# Patient Record
Sex: FEMALE | Race: WHITE | Employment: UNEMPLOYED | URBAN - NONMETROPOLITAN AREA
[De-identification: names, ages, dates, MRNs, and addresses within clinical notes are randomized per-mention and may not be internally consistent; named-entity substitution may affect disease eponyms.]

---

## 2017-01-01 ENCOUNTER — APPOINTMENT (OUTPATIENT)
Dept: GENERAL RADIOLOGY | Age: 67
DRG: 637 | End: 2017-01-01
Payer: MEDICARE

## 2017-01-01 ENCOUNTER — TELEPHONE (OUTPATIENT)
Dept: OTHER | Age: 67
End: 2017-01-01

## 2017-01-01 ENCOUNTER — HOSPITAL ENCOUNTER (INPATIENT)
Age: 67
LOS: 1 days | DRG: 638 | End: 2017-11-03
Attending: INTERNAL MEDICINE | Admitting: INTERNAL MEDICINE
Payer: COMMERCIAL

## 2017-01-01 ENCOUNTER — APPOINTMENT (OUTPATIENT)
Dept: CT IMAGING | Age: 67
DRG: 637 | End: 2017-01-01
Payer: MEDICARE

## 2017-01-01 ENCOUNTER — HOSPITAL ENCOUNTER (INPATIENT)
Age: 67
LOS: 2 days | Discharge: HOSPICE | DRG: 637 | End: 2017-11-02
Attending: EMERGENCY MEDICINE | Admitting: SURGERY
Payer: MEDICARE

## 2017-01-01 ENCOUNTER — TELEPHONE (OUTPATIENT)
Dept: NEUROLOGY | Age: 67
End: 2017-01-01

## 2017-01-01 ENCOUNTER — OFFICE VISIT (OUTPATIENT)
Dept: NEPHROLOGY | Age: 67
End: 2017-01-01

## 2017-01-01 ENCOUNTER — OFFICE VISIT (OUTPATIENT)
Dept: PHYSICAL MEDICINE AND REHAB | Age: 67
End: 2017-01-01

## 2017-01-01 VITALS
HEIGHT: 64 IN | HEART RATE: 86 BPM | TEMPERATURE: 98.6 F | WEIGHT: 55.8 LBS | BODY MASS INDEX: 9.53 KG/M2 | DIASTOLIC BLOOD PRESSURE: 55 MMHG | OXYGEN SATURATION: 99 % | RESPIRATION RATE: 16 BRPM | SYSTOLIC BLOOD PRESSURE: 125 MMHG

## 2017-01-01 VITALS
WEIGHT: 125.2 LBS | HEART RATE: 91 BPM | RESPIRATION RATE: 16 BRPM | SYSTOLIC BLOOD PRESSURE: 112 MMHG | BODY MASS INDEX: 21.37 KG/M2 | HEIGHT: 64 IN | TEMPERATURE: 97.9 F | DIASTOLIC BLOOD PRESSURE: 83 MMHG | OXYGEN SATURATION: 92 %

## 2017-01-01 VITALS — DIASTOLIC BLOOD PRESSURE: 60 MMHG | SYSTOLIC BLOOD PRESSURE: 142 MMHG | HEART RATE: 80 BPM | RESPIRATION RATE: 16 BRPM

## 2017-01-01 VITALS
BODY MASS INDEX: 18.78 KG/M2 | SYSTOLIC BLOOD PRESSURE: 128 MMHG | HEIGHT: 64 IN | DIASTOLIC BLOOD PRESSURE: 70 MMHG | HEART RATE: 85 BPM | WEIGHT: 110 LBS

## 2017-01-01 DIAGNOSIS — N17.9 AKI (ACUTE KIDNEY INJURY) (HCC): Primary | ICD-10-CM

## 2017-01-01 DIAGNOSIS — E87.29 METABOLIC ACIDOSIS, INCREASED ANION GAP: ICD-10-CM

## 2017-01-01 DIAGNOSIS — G25.2 RESTING TREMOR: Primary | ICD-10-CM

## 2017-01-01 DIAGNOSIS — N18.30 CKD (CHRONIC KIDNEY DISEASE) STAGE 3, GFR 30-59 ML/MIN (HCC): ICD-10-CM

## 2017-01-01 DIAGNOSIS — I10 BENIGN ESSENTIAL HTN: ICD-10-CM

## 2017-01-01 DIAGNOSIS — G93.41 METABOLIC ENCEPHALOPATHY: ICD-10-CM

## 2017-01-01 DIAGNOSIS — R79.89 ELEVATED BRAIN NATRIURETIC PEPTIDE (BNP) LEVEL: ICD-10-CM

## 2017-01-01 DIAGNOSIS — E11.11 DIABETIC KETOACIDOSIS WITH COMA ASSOCIATED WITH TYPE 2 DIABETES MELLITUS (HCC): ICD-10-CM

## 2017-01-01 DIAGNOSIS — R55 SYNCOPE AND COLLAPSE: ICD-10-CM

## 2017-01-01 DIAGNOSIS — E11.21 TYPE 2 DIABETES MELLITUS WITH DIABETIC NEPHROPATHY, UNSPECIFIED LONG TERM INSULIN USE STATUS: ICD-10-CM

## 2017-01-01 DIAGNOSIS — R77.8 ELEVATED TROPONIN: ICD-10-CM

## 2017-01-01 LAB
AEROBIC CULTURE: ABNORMAL
AEROBIC CULTURE: NORMAL
ALLEN TEST: POSITIVE
AMPHETAMINE+METHAMPHETAMINE URINE SCREEN: NEGATIVE
ANION GAP SERPL CALCULATED.3IONS-SCNC: 11 MEQ/L (ref 8–16)
ANION GAP SERPL CALCULATED.3IONS-SCNC: 13 MEQ/L (ref 8–16)
ANION GAP SERPL CALCULATED.3IONS-SCNC: 14 MEQ/L (ref 8–16)
ANION GAP SERPL CALCULATED.3IONS-SCNC: 17 MEQ/L (ref 8–16)
ANION GAP SERPL CALCULATED.3IONS-SCNC: 18 MEQ/L (ref 8–16)
ANION GAP SERPL CALCULATED.3IONS-SCNC: 22 MEQ/L (ref 8–16)
ANION GAP SERPL CALCULATED.3IONS-SCNC: 25 MEQ/L (ref 8–16)
ANION GAP SERPL CALCULATED.3IONS-SCNC: 29 MEQ/L (ref 8–16)
ANION GAP SERPL CALCULATED.3IONS-SCNC: 49 MEQ/L (ref 8–16)
ANISOCYTOSIS: ABNORMAL
AVERAGE GLUCOSE: 471 MG/DL (ref 70–126)
BACTERIA: ABNORMAL /HPF
BANDED NEUTROPHILS ABSOLUTE COUNT: 0.7 THOU/MM3
BANDED NEUTROPHILS ABSOLUTE COUNT: 1.8 THOU/MM3
BANDS PRESENT: 3 %
BANDS PRESENT: 8 %
BARBITURATE QUANTITATIVE URINE: NEGATIVE
BASE EXCESS (CALCULATED): -22.2 MMOL/L (ref -2.5–2.5)
BASE EXCESS (CALCULATED): -3.2 MMOL/L (ref -2.5–2.5)
BASE EXCESS (CALCULATED): -4.4 MMOL/L (ref -2.5–2.5)
BASE EXCESS (CALCULATED): -5.5 MMOL/L (ref -2.5–2.5)
BASE EXCESS (CALCULATED): -7.5 MMOL/L (ref -2.5–2.5)
BASOPHILS # BLD: 0 %
BASOPHILS # BLD: 0 %
BASOPHILS ABSOLUTE: 0 THOU/MM3 (ref 0–0.1)
BASOPHILS ABSOLUTE: 0 THOU/MM3 (ref 0–0.1)
BENZODIAZEPINE QUANTITATIVE URINE: NEGATIVE
BETA-HYDROXYBUTYRATE: 130.01 MG/DL (ref 0.2–2.81)
BILIRUBIN URINE: NEGATIVE
BLOOD, URINE: NEGATIVE
BUN BLDV-MCNC: 110 MG/DL (ref 7–22)
BUN BLDV-MCNC: 55 MG/DL (ref 7–22)
BUN BLDV-MCNC: 56 MG/DL (ref 7–22)
BUN BLDV-MCNC: 57 MG/DL (ref 7–22)
BUN BLDV-MCNC: 60 MG/DL (ref 7–22)
BUN BLDV-MCNC: 64 MG/DL (ref 7–22)
BUN BLDV-MCNC: 64 MG/DL (ref 7–22)
BUN BLDV-MCNC: 74 MG/DL (ref 7–22)
BUN BLDV-MCNC: 85 MG/DL (ref 7–22)
BUN BLDV-MCNC: 93 MG/DL (ref 7–22)
BUN BLDV-MCNC: 98 MG/DL (ref 7–22)
CALCIUM IONIZED: 1.01 MMOL/L (ref 1.12–1.32)
CALCIUM IONIZED: 1.02 MMOL/L (ref 1.12–1.32)
CALCIUM IONIZED: 1.02 MMOL/L (ref 1.12–1.32)
CALCIUM SERPL-MCNC: 7.1 MG/DL (ref 8.5–10.5)
CALCIUM SERPL-MCNC: 7.2 MG/DL (ref 8.5–10.5)
CALCIUM SERPL-MCNC: 7.3 MG/DL (ref 8.5–10.5)
CALCIUM SERPL-MCNC: 7.4 MG/DL (ref 8.5–10.5)
CALCIUM SERPL-MCNC: 7.5 MG/DL (ref 8.5–10.5)
CALCIUM SERPL-MCNC: 7.8 MG/DL (ref 8.5–10.5)
CALCIUM SERPL-MCNC: 7.9 MG/DL (ref 8.5–10.5)
CALCIUM SERPL-MCNC: 9.4 MG/DL (ref 8.5–10.5)
CANNABINOID QUANTITATIVE URINE: NEGATIVE
CASTS 2: ABNORMAL /LPF
CASTS UA: ABNORMAL /LPF
CHARACTER, URINE: CLEAR
CHLORIDE BLD-SCNC: 102 MEQ/L (ref 98–111)
CHLORIDE BLD-SCNC: 102 MEQ/L (ref 98–111)
CHLORIDE BLD-SCNC: 104 MEQ/L (ref 98–111)
CHLORIDE BLD-SCNC: 106 MEQ/L (ref 98–111)
CHLORIDE BLD-SCNC: 107 MEQ/L (ref 98–111)
CHLORIDE BLD-SCNC: 107 MEQ/L (ref 98–111)
CHLORIDE BLD-SCNC: 109 MEQ/L (ref 98–111)
CHLORIDE BLD-SCNC: 110 MEQ/L (ref 98–111)
CHLORIDE BLD-SCNC: 110 MEQ/L (ref 98–111)
CHLORIDE BLD-SCNC: 85 MEQ/L (ref 98–111)
CHLORIDE BLD-SCNC: 99 MEQ/L (ref 98–111)
CO2: 15 MEQ/L (ref 23–33)
CO2: 16 MEQ/L (ref 23–33)
CO2: 17 MEQ/L (ref 23–33)
CO2: 18 MEQ/L (ref 23–33)
CO2: 20 MEQ/L (ref 23–33)
CO2: 6 MEQ/L (ref 23–33)
COCAINE METABOLITE QUANTITATIVE URINE: NEGATIVE
COLLECTED BY:: ABNORMAL
COLOR: YELLOW
CREAT SERPL-MCNC: 2.4 MG/DL (ref 0.4–1.2)
CREAT SERPL-MCNC: 2.6 MG/DL (ref 0.4–1.2)
CREAT SERPL-MCNC: 2.7 MG/DL (ref 0.4–1.2)
CREAT SERPL-MCNC: 2.8 MG/DL (ref 0.4–1.2)
CREAT SERPL-MCNC: 2.9 MG/DL (ref 0.4–1.2)
CREAT SERPL-MCNC: 3 MG/DL (ref 0.4–1.2)
CREAT SERPL-MCNC: 3.3 MG/DL (ref 0.4–1.2)
CREAT SERPL-MCNC: 3.5 MG/DL (ref 0.4–1.2)
CREAT SERPL-MCNC: 4.1 MG/DL (ref 0.4–1.2)
CRYSTALS, UA: ABNORMAL
DEVICE: ABNORMAL
DIFFERENTIAL, MANUAL: NORMAL
DIFFERENTIAL, MANUAL: NORMAL
EKG ATRIAL RATE: 105 BPM
EKG ATRIAL RATE: 87 BPM
EKG P AXIS: 40 DEGREES
EKG P AXIS: 74 DEGREES
EKG P-R INTERVAL: 116 MS
EKG P-R INTERVAL: 134 MS
EKG Q-T INTERVAL: 410 MS
EKG Q-T INTERVAL: 476 MS
EKG QRS DURATION: 136 MS
EKG QRS DURATION: 138 MS
EKG QTC CALCULATION (BAZETT): 541 MS
EKG QTC CALCULATION (BAZETT): 572 MS
EKG R AXIS: 72 DEGREES
EKG R AXIS: 76 DEGREES
EKG T AXIS: 21 DEGREES
EKG T AXIS: 34 DEGREES
EKG VENTRICULAR RATE: 105 BPM
EKG VENTRICULAR RATE: 87 BPM
EOSINOPHIL # BLD: 0 %
EOSINOPHIL # BLD: 0 %
EOSINOPHILS ABSOLUTE: 0 THOU/MM3 (ref 0–0.4)
EOSINOPHILS ABSOLUTE: 0 THOU/MM3 (ref 0–0.4)
EPITHELIAL CELLS, UA: ABNORMAL /HPF
GFR SERPL CREATININE-BSD FRML MDRD: 11 ML/MIN/1.73M2
GFR SERPL CREATININE-BSD FRML MDRD: 13 ML/MIN/1.73M2
GFR SERPL CREATININE-BSD FRML MDRD: 14 ML/MIN/1.73M2
GFR SERPL CREATININE-BSD FRML MDRD: 16 ML/MIN/1.73M2
GFR SERPL CREATININE-BSD FRML MDRD: 16 ML/MIN/1.73M2
GFR SERPL CREATININE-BSD FRML MDRD: 17 ML/MIN/1.73M2
GFR SERPL CREATININE-BSD FRML MDRD: 18 ML/MIN/1.73M2
GFR SERPL CREATININE-BSD FRML MDRD: 20 ML/MIN/1.73M2
GLUCOSE BLD-MCNC: 1006 MG/DL (ref 70–108)
GLUCOSE BLD-MCNC: 1090 MG/DL (ref 70–108)
GLUCOSE BLD-MCNC: 116 MG/DL (ref 70–108)
GLUCOSE BLD-MCNC: 1198 MG/DL (ref 70–108)
GLUCOSE BLD-MCNC: 167 MG/DL (ref 70–108)
GLUCOSE BLD-MCNC: 208 MG/DL (ref 70–108)
GLUCOSE BLD-MCNC: 232 MG/DL (ref 70–108)
GLUCOSE BLD-MCNC: 243 MG/DL (ref 70–108)
GLUCOSE BLD-MCNC: 255 MG/DL (ref 70–108)
GLUCOSE BLD-MCNC: 264 MG/DL (ref 70–108)
GLUCOSE BLD-MCNC: 303 MG/DL (ref 70–108)
GLUCOSE BLD-MCNC: 384 MG/DL (ref 70–108)
GLUCOSE BLD-MCNC: 581 MG/DL (ref 70–108)
GLUCOSE BLD-MCNC: 907 MG/DL (ref 70–108)
GLUCOSE BLD-MCNC: > 600 MG/DL (ref 70–108)
GLUCOSE URINE: >= 1000 MG/DL
GLUCOSE, WHOLE BLOOD: 160 MG/DL (ref 70–108)
GLUCOSE, WHOLE BLOOD: 180 MG/DL (ref 70–108)
GLUCOSE, WHOLE BLOOD: 207 MG/DL (ref 70–108)
GLUCOSE, WHOLE BLOOD: 215 MG/DL (ref 70–108)
GLUCOSE, WHOLE BLOOD: 218 MG/DL (ref 70–108)
GLUCOSE, WHOLE BLOOD: 222 MG/DL (ref 70–108)
GLUCOSE, WHOLE BLOOD: 230 MG/DL (ref 70–108)
GLUCOSE, WHOLE BLOOD: 239 MG/DL (ref 70–108)
GLUCOSE, WHOLE BLOOD: 241 MG/DL (ref 70–108)
GLUCOSE, WHOLE BLOOD: 242 MG/DL (ref 70–108)
GLUCOSE, WHOLE BLOOD: 246 MG/DL (ref 70–108)
GLUCOSE, WHOLE BLOOD: 253 MG/DL (ref 70–108)
GLUCOSE, WHOLE BLOOD: 259 MG/DL (ref 70–108)
GLUCOSE, WHOLE BLOOD: 263 MG/DL (ref 70–108)
GLUCOSE, WHOLE BLOOD: 270 MG/DL (ref 70–108)
GLUCOSE, WHOLE BLOOD: 270 MG/DL (ref 70–108)
GLUCOSE, WHOLE BLOOD: 271 MG/DL (ref 70–108)
GLUCOSE, WHOLE BLOOD: 275 MG/DL (ref 70–108)
GLUCOSE, WHOLE BLOOD: 277 MG/DL (ref 70–108)
GLUCOSE, WHOLE BLOOD: 279 MG/DL (ref 70–108)
GLUCOSE, WHOLE BLOOD: 293 MG/DL (ref 70–108)
GLUCOSE, WHOLE BLOOD: 302 MG/DL (ref 70–108)
GLUCOSE, WHOLE BLOOD: 308 MG/DL (ref 70–108)
GLUCOSE, WHOLE BLOOD: 320 MG/DL (ref 70–108)
GLUCOSE, WHOLE BLOOD: 372 MG/DL (ref 70–108)
GLUCOSE, WHOLE BLOOD: 416 MG/DL (ref 70–108)
GLUCOSE, WHOLE BLOOD: 484 MG/DL (ref 70–108)
GLUCOSE, WHOLE BLOOD: 553 MG/DL (ref 70–108)
GLUCOSE, WHOLE BLOOD: 611 MG/DL (ref 70–108)
GLUCOSE, WHOLE BLOOD: > 700 MG/DL (ref 70–108)
GRAM STAIN RESULT: ABNORMAL
GRAM STAIN RESULT: ABNORMAL
GRAM STAIN RESULT: NORMAL
GRAM STAIN RESULT: NORMAL
HBA1C MFR BLD: 17.7 % (ref 4.4–6.4)
HCO3: 16 MMOL/L (ref 23–28)
HCO3: 18 MMOL/L (ref 23–28)
HCO3: 18 MMOL/L (ref 23–28)
HCO3: 21 MMOL/L (ref 23–28)
HCO3: 5 MMOL/L (ref 23–28)
HCT VFR BLD CALC: 29.8 % (ref 37–47)
HCT VFR BLD CALC: 30.3 % (ref 37–47)
HCT VFR BLD CALC: 35.1 % (ref 37–47)
HCT VFR BLD CALC: 45.9 % (ref 37–47)
HEMOGLOBIN: 10.1 GM/DL (ref 12–16)
HEMOGLOBIN: 11.4 GM/DL (ref 12–16)
HEMOGLOBIN: 14 GM/DL (ref 12–16)
HEMOGLOBIN: 9.8 GM/DL (ref 12–16)
IFIO2: 21
IFIO2: 25
IFIO2: 30
IFIO2: 40
IFIO2: 80
KETONES, URINE: 40
LACTIC ACID: 2.2 MMOL/L (ref 0.5–2.2)
LACTIC ACID: 2.9 MMOL/L (ref 0.5–2.2)
LEUKOCYTE ESTERASE, URINE: NEGATIVE
LIPASE: 51.7 U/L (ref 5.6–51.3)
LYMPHOCYTES # BLD: 13 %
LYMPHOCYTES # BLD: 5 %
LYMPHOCYTES ABSOLUTE: 1.1 THOU/MM3 (ref 1–4.8)
LYMPHOCYTES ABSOLUTE: 3 THOU/MM3 (ref 1–4.8)
MAGNESIUM: 1.2 MG/DL (ref 1.6–2.4)
MAGNESIUM: 1.4 MG/DL (ref 1.6–2.4)
MAGNESIUM: 1.6 MG/DL (ref 1.6–2.4)
MAGNESIUM: 1.7 MG/DL (ref 1.6–2.4)
MAGNESIUM: 1.7 MG/DL (ref 1.6–2.4)
MAGNESIUM: 1.8 MG/DL (ref 1.6–2.4)
MAGNESIUM: 1.9 MG/DL (ref 1.6–2.4)
MAGNESIUM: 2 MG/DL (ref 1.6–2.4)
MCH RBC QN AUTO: 28.2 PG (ref 27–31)
MCH RBC QN AUTO: 28.5 PG (ref 27–31)
MCH RBC QN AUTO: 28.5 PG (ref 27–31)
MCH RBC QN AUTO: 28.9 PG (ref 27–31)
MCHC RBC AUTO-ENTMCNC: 30.4 GM/DL (ref 33–37)
MCHC RBC AUTO-ENTMCNC: 32.4 GM/DL (ref 33–37)
MCHC RBC AUTO-ENTMCNC: 32.7 GM/DL (ref 33–37)
MCHC RBC AUTO-ENTMCNC: 33.2 GM/DL (ref 33–37)
MCV RBC AUTO: 84.8 FL (ref 81–99)
MCV RBC AUTO: 87 FL (ref 81–99)
MCV RBC AUTO: 89.3 FL (ref 81–99)
MCV RBC AUTO: 93.4 FL (ref 81–99)
METAMYELOCYTES: 1 %
METAMYELOCYTES: 2 %
MISCELLANEOUS 2: ABNORMAL
MODE: AC
MONOCYTES # BLD: 11 %
MONOCYTES # BLD: 6 %
MONOCYTES ABSOLUTE: 1.4 THOU/MM3 (ref 0.4–1.3)
MONOCYTES ABSOLUTE: 2.4 THOU/MM3 (ref 0.4–1.3)
MRSA SCREEN RT-PCR: NEGATIVE
MRSA SCREEN: NORMAL
MYOGLOBIN URINE: 19 MG/L (ref 0–1)
MYOGLOBIN URINE: POSITIVE
NITRITE, URINE: NEGATIVE
NUCLEATED RED BLOOD CELLS: 0 /100 WBC
NUCLEATED RED BLOOD CELLS: 0 /100 WBC
O2 SATURATION: 100 %
O2 SATURATION: 96 %
O2 SATURATION: 98 %
O2 SATURATION: 99 %
O2 SATURATION: 99 %
OPIATES, URINE: NEGATIVE
ORGANISM: ABNORMAL
ORGANISM: ABNORMAL
OSMOLALITY CALCULATION: 298.3 MOSMOL/KG (ref 275–300)
OSMOLALITY CALCULATION: 299.9 MOSMOL/KG (ref 275–300)
OSMOLALITY CALCULATION: 300.4 MOSMOL/KG (ref 275–300)
OSMOLALITY CALCULATION: 303.5 MOSMOL/KG (ref 275–300)
OSMOLALITY CALCULATION: 304.3 MOSMOL/KG (ref 275–300)
OSMOLALITY CALCULATION: 305.1 MOSMOL/KG (ref 275–300)
OSMOLALITY CALCULATION: 305.8 MOSMOL/KG (ref 275–300)
OSMOLALITY CALCULATION: 328.5 MOSMOL/KG (ref 275–300)
OSMOLALITY CALCULATION: 347.9 MOSMOL/KG (ref 275–300)
OSMOLALITY CALCULATION: 375.2 MOSMOL/KG (ref 275–300)
OSMOLALITY: 323 MOSMOL/KG (ref 275–295)
OSMOLALITY: 377 MOSMOL/KG (ref 275–295)
OXYCODONE: NEGATIVE
PCO2 (TEMP CORRECTED): 13 (ref 35–45)
PCO2: 16 MMHG (ref 35–45)
PCO2: 25 MMHG (ref 35–45)
PCO2: 25 MMHG (ref 35–45)
PCO2: 28 MMHG (ref 35–45)
PCO2: 36 MMHG (ref 35–45)
PDW BLD-RTO: 13.5 % (ref 11.5–14.5)
PDW BLD-RTO: 13.7 % (ref 11.5–14.5)
PDW BLD-RTO: 13.9 % (ref 11.5–14.5)
PDW BLD-RTO: 15.1 % (ref 11.5–14.5)
PH (TEMPERATURE CORRECTED): 7.17 (ref 7.35–7.45)
PH BLOOD GAS: 7.12 (ref 7.35–7.45)
PH BLOOD GAS: 7.39 (ref 7.35–7.45)
PH BLOOD GAS: 7.41 (ref 7.35–7.45)
PH BLOOD GAS: 7.42 (ref 7.35–7.45)
PH BLOOD GAS: 7.47 (ref 7.35–7.45)
PH UA: 5.5
PHENCYCLIDINE QUANTITATIVE URINE: NEGATIVE
PHENOBARBITAL: 40.9 MCG/ML (ref 10–48)
PHOSPHORUS: 1.5 MG/DL (ref 2.4–4.7)
PHOSPHORUS: 1.7 MG/DL (ref 2.4–4.7)
PHOSPHORUS: 2.3 MG/DL (ref 2.4–4.7)
PHOSPHORUS: 2.6 MG/DL (ref 2.4–4.7)
PHOSPHORUS: 2.7 MG/DL (ref 2.4–4.7)
PHOSPHORUS: 2.7 MG/DL (ref 2.4–4.7)
PHOSPHORUS: 2.8 MG/DL (ref 2.4–4.7)
PHOSPHORUS: 3.1 MG/DL (ref 2.4–4.7)
PHOSPHORUS: 3.1 MG/DL (ref 2.4–4.7)
PHOSPHORUS: 3.5 MG/DL (ref 2.4–4.7)
PLATELET # BLD: 164 THOU/MM3 (ref 130–400)
PLATELET # BLD: 174 THOU/MM3 (ref 130–400)
PLATELET # BLD: 200 THOU/MM3 (ref 130–400)
PLATELET # BLD: 273 THOU/MM3 (ref 130–400)
PLATELET ESTIMATE: ADEQUATE
PLATELET ESTIMATE: ADEQUATE
PMV BLD AUTO: 10.6 MCM (ref 7.4–10.4)
PMV BLD AUTO: 9 MCM (ref 7.4–10.4)
PMV BLD AUTO: 9.1 MCM (ref 7.4–10.4)
PMV BLD AUTO: 9.6 MCM (ref 7.4–10.4)
PO2 (TEMP CORRECTED): 82 (ref 71–104)
PO2: 103 MMHG (ref 71–104)
PO2: 110 MMHG (ref 71–104)
PO2: 131 MMHG (ref 71–104)
PO2: 180 MMHG (ref 71–104)
PO2: 99 MMHG (ref 71–104)
POTASSIUM SERPL-SCNC: 2.5 MEQ/L (ref 3.5–5.2)
POTASSIUM SERPL-SCNC: 2.5 MEQ/L (ref 3.5–5.2)
POTASSIUM SERPL-SCNC: 3.4 MEQ/L (ref 3.5–5.2)
POTASSIUM SERPL-SCNC: 3.9 MEQ/L (ref 3.5–5.2)
POTASSIUM SERPL-SCNC: 4.2 MEQ/L (ref 3.5–5.2)
POTASSIUM SERPL-SCNC: 4.5 MEQ/L (ref 3.5–5.2)
POTASSIUM SERPL-SCNC: 4.5 MEQ/L (ref 3.5–5.2)
POTASSIUM SERPL-SCNC: 4.9 MEQ/L (ref 3.5–5.2)
POTASSIUM SERPL-SCNC: 5.1 MEQ/L (ref 3.5–5.2)
POTASSIUM SERPL-SCNC: 5.2 MEQ/L (ref 3.5–5.2)
POTASSIUM SERPL-SCNC: 5.9 MEQ/L (ref 3.5–5.2)
PRO-BNP: 7659 PG/ML (ref 0–900)
PROCALCITONIN: 1.98 NG/ML (ref 0.01–0.09)
PROCALCITONIN: 2.62 NG/ML (ref 0.01–0.09)
PROCALCITONIN: 2.94 NG/ML (ref 0.01–0.09)
PROTEIN UA: NEGATIVE
RBC # BLD: 3.43 MILL/MM3 (ref 4.2–5.4)
RBC # BLD: 3.57 MILL/MM3 (ref 4.2–5.4)
RBC # BLD: 3.93 MILL/MM3 (ref 4.2–5.4)
RBC # BLD: 4.89 MILL/MM3 (ref 4.2–5.4)
RBC URINE: ABNORMAL /HPF
RENAL EPITHELIAL, UA: ABNORMAL
RESPIRATORY CULTURE: NORMAL
SEG NEUTROPHILS: 72 %
SEG NEUTROPHILS: 79 %
SEGMENTED NEUTROPHILS ABSOLUTE COUNT: 16.6 THOU/MM3 (ref 1.8–7.7)
SEGMENTED NEUTROPHILS ABSOLUTE COUNT: 17.3 THOU/MM3 (ref 1.8–7.7)
SET PEEP: 5 MMHG
SET RESPIRATORY RATE: 14 BPM
SET TIDAL VOLUME: 420 ML
SET TIDAL VOLUME: 450 ML
SODIUM BLD-SCNC: 137 MEQ/L (ref 135–145)
SODIUM BLD-SCNC: 139 MEQ/L (ref 135–145)
SODIUM BLD-SCNC: 140 MEQ/L (ref 135–145)
SODIUM BLD-SCNC: 142 MEQ/L (ref 135–145)
SODIUM BLD-SCNC: 144 MEQ/L (ref 135–145)
SODIUM BLD-SCNC: 145 MEQ/L (ref 135–145)
SODIUM BLD-SCNC: 147 MEQ/L (ref 135–145)
SOURCE, BLOOD GAS: ABNORMAL
SPECIFIC GRAVITY, URINE: 1.03 (ref 1–1.03)
T4 FREE: 1.27 NG/DL (ref 0.93–1.76)
TEMPERATURE: 33.3
TOTAL CK: 2757 U/L (ref 30–135)
TOTAL CK: 3355 U/L (ref 30–135)
TOTAL CK: 3657 U/L (ref 30–135)
TROPONIN T: 0.22 NG/ML
TROPONIN T: 0.66 NG/ML
TROPONIN T: 0.76 NG/ML
TROPONIN T: 0.88 NG/ML
TSH SERPL DL<=0.05 MIU/L-ACNC: 1.94 UIU/ML (ref 0.4–4.2)
URINE CULTURE, ROUTINE: NORMAL
UROBILINOGEN, URINE: 0.2 EU/DL
VANCOMYCIN RANDOM: 10.7 UG/ML (ref 0.1–39.9)
VRE CULTURE: NORMAL
WBC # BLD: 14.9 THOU/MM3 (ref 4.8–10.8)
WBC # BLD: 21.9 THOU/MM3 (ref 4.8–10.8)
WBC # BLD: 23.1 THOU/MM3 (ref 4.8–10.8)
WBC # BLD: 9 THOU/MM3 (ref 4.8–10.8)
WBC UA: ABNORMAL /HPF
YEAST: ABNORMAL

## 2017-01-01 PROCEDURE — C1751 CATH, INF, PER/CENT/MIDLINE: HCPCS

## 2017-01-01 PROCEDURE — 6360000002 HC RX W HCPCS: Performed by: INTERNAL MEDICINE

## 2017-01-01 PROCEDURE — 1111F DSCHRG MED/CURRENT MED MERGE: CPT | Performed by: INTERNAL MEDICINE

## 2017-01-01 PROCEDURE — 80048 BASIC METABOLIC PNL TOTAL CA: CPT

## 2017-01-01 PROCEDURE — 82948 REAGENT STRIP/BLOOD GLUCOSE: CPT

## 2017-01-01 PROCEDURE — 84100 ASSAY OF PHOSPHORUS: CPT

## 2017-01-01 PROCEDURE — 87086 URINE CULTURE/COLONY COUNT: CPT

## 2017-01-01 PROCEDURE — 2580000003 HC RX 258: Performed by: INTERNAL MEDICINE

## 2017-01-01 PROCEDURE — G8598 ASA/ANTIPLAT THER USED: HCPCS | Performed by: INTERNAL MEDICINE

## 2017-01-01 PROCEDURE — 6370000000 HC RX 637 (ALT 250 FOR IP): Performed by: INTERNAL MEDICINE

## 2017-01-01 PROCEDURE — 36600 WITHDRAWAL OF ARTERIAL BLOOD: CPT

## 2017-01-01 PROCEDURE — 3014F SCREEN MAMMO DOC REV: CPT | Performed by: PSYCHIATRY & NEUROLOGY

## 2017-01-01 PROCEDURE — 2500000003 HC RX 250 WO HCPCS: Performed by: INTERNAL MEDICINE

## 2017-01-01 PROCEDURE — 93005 ELECTROCARDIOGRAM TRACING: CPT

## 2017-01-01 PROCEDURE — 87081 CULTURE SCREEN ONLY: CPT

## 2017-01-01 PROCEDURE — 6360000002 HC RX W HCPCS

## 2017-01-01 PROCEDURE — 2580000003 HC RX 258: Performed by: FAMILY MEDICINE

## 2017-01-01 PROCEDURE — 99292 CRITICAL CARE ADDL 30 MIN: CPT | Performed by: INTERNAL MEDICINE

## 2017-01-01 PROCEDURE — 36415 COLL VENOUS BLD VENIPUNCTURE: CPT

## 2017-01-01 PROCEDURE — 87040 BLOOD CULTURE FOR BACTERIA: CPT

## 2017-01-01 PROCEDURE — 80202 ASSAY OF VANCOMYCIN: CPT

## 2017-01-01 PROCEDURE — 82550 ASSAY OF CK (CPK): CPT

## 2017-01-01 PROCEDURE — 3046F HEMOGLOBIN A1C LEVEL >9.0%: CPT | Performed by: INTERNAL MEDICINE

## 2017-01-01 PROCEDURE — G8427 DOCREV CUR MEDS BY ELIG CLIN: HCPCS | Performed by: INTERNAL MEDICINE

## 2017-01-01 PROCEDURE — 99285 EMERGENCY DEPT VISIT HI MDM: CPT

## 2017-01-01 PROCEDURE — 83930 ASSAY OF BLOOD OSMOLALITY: CPT

## 2017-01-01 PROCEDURE — 36592 COLLECT BLOOD FROM PICC: CPT

## 2017-01-01 PROCEDURE — 82947 ASSAY GLUCOSE BLOOD QUANT: CPT

## 2017-01-01 PROCEDURE — 87186 SC STD MICRODIL/AGAR DIL: CPT

## 2017-01-01 PROCEDURE — G8598 ASA/ANTIPLAT THER USED: HCPCS | Performed by: PSYCHIATRY & NEUROLOGY

## 2017-01-01 PROCEDURE — 84484 ASSAY OF TROPONIN QUANT: CPT

## 2017-01-01 PROCEDURE — 6370000000 HC RX 637 (ALT 250 FOR IP): Performed by: FAMILY MEDICINE

## 2017-01-01 PROCEDURE — 95819 EEG AWAKE AND ASLEEP: CPT

## 2017-01-01 PROCEDURE — 2700000000 HC OXYGEN THERAPY PER DAY

## 2017-01-01 PROCEDURE — 87077 CULTURE AEROBIC IDENTIFY: CPT

## 2017-01-01 PROCEDURE — 3017F COLORECTAL CA SCREEN DOC REV: CPT | Performed by: PSYCHIATRY & NEUROLOGY

## 2017-01-01 PROCEDURE — 96366 THER/PROPH/DIAG IV INF ADDON: CPT

## 2017-01-01 PROCEDURE — 06HM33Z INSERTION OF INFUSION DEVICE INTO RIGHT FEMORAL VEIN, PERCUTANEOUS APPROACH: ICD-10-PCS | Performed by: FAMILY MEDICINE

## 2017-01-01 PROCEDURE — A6212 FOAM DRG <=16 SQ IN W/BORDER: HCPCS

## 2017-01-01 PROCEDURE — 87184 SC STD DISK METHOD PER PLATE: CPT

## 2017-01-01 PROCEDURE — 70490 CT SOFT TISSUE NECK W/O DYE: CPT

## 2017-01-01 PROCEDURE — 71010 XR CHEST PORTABLE: CPT

## 2017-01-01 PROCEDURE — 85027 COMPLETE CBC AUTOMATED: CPT

## 2017-01-01 PROCEDURE — 83690 ASSAY OF LIPASE: CPT

## 2017-01-01 PROCEDURE — G8419 CALC BMI OUT NRM PARAM NOF/U: HCPCS | Performed by: INTERNAL MEDICINE

## 2017-01-01 PROCEDURE — 82803 BLOOD GASES ANY COMBINATION: CPT

## 2017-01-01 PROCEDURE — 73502 X-RAY EXAM HIP UNI 2-3 VIEWS: CPT

## 2017-01-01 PROCEDURE — 99239 HOSP IP/OBS DSCHRG MGMT >30: CPT | Performed by: INTERNAL MEDICINE

## 2017-01-01 PROCEDURE — 2500000003 HC RX 250 WO HCPCS

## 2017-01-01 PROCEDURE — 96375 TX/PRO/DX INJ NEW DRUG ADDON: CPT

## 2017-01-01 PROCEDURE — 4040F PNEUMOC VAC/ADMIN/RCVD: CPT | Performed by: PSYCHIATRY & NEUROLOGY

## 2017-01-01 PROCEDURE — 84145 PROCALCITONIN (PCT): CPT

## 2017-01-01 PROCEDURE — 80184 ASSAY OF PHENOBARBITAL: CPT

## 2017-01-01 PROCEDURE — S0028 INJECTION, FAMOTIDINE, 20 MG: HCPCS | Performed by: INTERNAL MEDICINE

## 2017-01-01 PROCEDURE — 87147 CULTURE TYPE IMMUNOLOGIC: CPT

## 2017-01-01 PROCEDURE — 70450 CT HEAD/BRAIN W/O DYE: CPT

## 2017-01-01 PROCEDURE — 1090F PRES/ABSN URINE INCON ASSESS: CPT | Performed by: INTERNAL MEDICINE

## 2017-01-01 PROCEDURE — 82330 ASSAY OF CALCIUM: CPT

## 2017-01-01 PROCEDURE — 94002 VENT MGMT INPAT INIT DAY: CPT

## 2017-01-01 PROCEDURE — 3017F COLORECTAL CA SCREEN DOC REV: CPT | Performed by: INTERNAL MEDICINE

## 2017-01-01 PROCEDURE — 1090F PRES/ABSN URINE INCON ASSESS: CPT | Performed by: PSYCHIATRY & NEUROLOGY

## 2017-01-01 PROCEDURE — 2000000000 HC ICU R&B

## 2017-01-01 PROCEDURE — 76376 3D RENDER W/INTRP POSTPROCES: CPT

## 2017-01-01 PROCEDURE — 87070 CULTURE OTHR SPECIMN AEROBIC: CPT

## 2017-01-01 PROCEDURE — G8400 PT W/DXA NO RESULTS DOC: HCPCS | Performed by: PSYCHIATRY & NEUROLOGY

## 2017-01-01 PROCEDURE — G8484 FLU IMMUNIZE NO ADMIN: HCPCS | Performed by: PSYCHIATRY & NEUROLOGY

## 2017-01-01 PROCEDURE — 87205 SMEAR GRAM STAIN: CPT

## 2017-01-01 PROCEDURE — G8400 PT W/DXA NO RESULTS DOC: HCPCS | Performed by: INTERNAL MEDICINE

## 2017-01-01 PROCEDURE — 1036F TOBACCO NON-USER: CPT | Performed by: PSYCHIATRY & NEUROLOGY

## 2017-01-01 PROCEDURE — 83735 ASSAY OF MAGNESIUM: CPT

## 2017-01-01 PROCEDURE — 2580000003 HC RX 258

## 2017-01-01 PROCEDURE — 82010 KETONE BODYS QUAN: CPT

## 2017-01-01 PROCEDURE — 1123F ACP DISCUSS/DSCN MKR DOCD: CPT | Performed by: INTERNAL MEDICINE

## 2017-01-01 PROCEDURE — 81001 URINALYSIS AUTO W/SCOPE: CPT

## 2017-01-01 PROCEDURE — 83880 ASSAY OF NATRIURETIC PEPTIDE: CPT

## 2017-01-01 PROCEDURE — 94003 VENT MGMT INPAT SUBQ DAY: CPT

## 2017-01-01 PROCEDURE — G8419 CALC BMI OUT NRM PARAM NOF/U: HCPCS | Performed by: PSYCHIATRY & NEUROLOGY

## 2017-01-01 PROCEDURE — 6360000002 HC RX W HCPCS: Performed by: FAMILY MEDICINE

## 2017-01-01 PROCEDURE — 96365 THER/PROPH/DIAG IV INF INIT: CPT

## 2017-01-01 PROCEDURE — 87641 MR-STAPH DNA AMP PROBE: CPT

## 2017-01-01 PROCEDURE — 99213 OFFICE O/P EST LOW 20 MIN: CPT | Performed by: INTERNAL MEDICINE

## 2017-01-01 PROCEDURE — 2500000003 HC RX 250 WO HCPCS: Performed by: FAMILY MEDICINE

## 2017-01-01 PROCEDURE — G8484 FLU IMMUNIZE NO ADMIN: HCPCS | Performed by: INTERNAL MEDICINE

## 2017-01-01 PROCEDURE — 85025 COMPLETE CBC W/AUTO DIFF WBC: CPT

## 2017-01-01 PROCEDURE — 4040F PNEUMOC VAC/ADMIN/RCVD: CPT | Performed by: INTERNAL MEDICINE

## 2017-01-01 PROCEDURE — 36556 INSERT NON-TUNNEL CV CATH: CPT

## 2017-01-01 PROCEDURE — 5A1945Z RESPIRATORY VENTILATION, 24-96 CONSECUTIVE HOURS: ICD-10-PCS | Performed by: INTERNAL MEDICINE

## 2017-01-01 PROCEDURE — 84439 ASSAY OF FREE THYROXINE: CPT

## 2017-01-01 PROCEDURE — 1123F ACP DISCUSS/DSCN MKR DOCD: CPT | Performed by: PSYCHIATRY & NEUROLOGY

## 2017-01-01 PROCEDURE — A4421 OSTOMY SUPPLY MISC: HCPCS

## 2017-01-01 PROCEDURE — 99291 CRITICAL CARE FIRST HOUR: CPT | Performed by: INTERNAL MEDICINE

## 2017-01-01 PROCEDURE — 31500 INSERT EMERGENCY AIRWAY: CPT | Performed by: INTERNAL MEDICINE

## 2017-01-01 PROCEDURE — 3014F SCREEN MAMMO DOC REV: CPT | Performed by: INTERNAL MEDICINE

## 2017-01-01 PROCEDURE — 83036 HEMOGLOBIN GLYCOSYLATED A1C: CPT

## 2017-01-01 PROCEDURE — 75984 XRAY CONTROL CATHETER CHANGE: CPT

## 2017-01-01 PROCEDURE — 80307 DRUG TEST PRSMV CHEM ANLYZR: CPT

## 2017-01-01 PROCEDURE — 1111F DSCHRG MED/CURRENT MED MERGE: CPT | Performed by: PSYCHIATRY & NEUROLOGY

## 2017-01-01 PROCEDURE — 96367 TX/PROPH/DG ADDL SEQ IV INF: CPT

## 2017-01-01 PROCEDURE — 99213 OFFICE O/P EST LOW 20 MIN: CPT | Performed by: PSYCHIATRY & NEUROLOGY

## 2017-01-01 PROCEDURE — 83605 ASSAY OF LACTIC ACID: CPT

## 2017-01-01 PROCEDURE — 73110 X-RAY EXAM OF WRIST: CPT

## 2017-01-01 PROCEDURE — 1036F TOBACCO NON-USER: CPT | Performed by: INTERNAL MEDICINE

## 2017-01-01 PROCEDURE — 83874 ASSAY OF MYOGLOBIN: CPT

## 2017-01-01 PROCEDURE — 1200000000 HC SEMI PRIVATE

## 2017-01-01 PROCEDURE — 84443 ASSAY THYROID STIM HORMONE: CPT

## 2017-01-01 PROCEDURE — G8427 DOCREV CUR MEDS BY ELIG CLIN: HCPCS | Performed by: PSYCHIATRY & NEUROLOGY

## 2017-01-01 PROCEDURE — 0CHY7BZ INSERTION OF AIRWAY INTO MOUTH AND THROAT, VIA NATURAL OR ARTIFICIAL OPENING: ICD-10-PCS | Performed by: INTERNAL MEDICINE

## 2017-01-01 RX ORDER — MORPHINE SULFATE 4 MG/ML
3 INJECTION, SOLUTION INTRAMUSCULAR; INTRAVENOUS
Status: CANCELLED | OUTPATIENT
Start: 2017-01-01

## 2017-01-01 RX ORDER — SODIUM CHLORIDE 0.9 % (FLUSH) 0.9 %
10 SYRINGE (ML) INJECTION EVERY 12 HOURS SCHEDULED
Status: DISCONTINUED | OUTPATIENT
Start: 2017-01-01 | End: 2017-01-01

## 2017-01-01 RX ORDER — AMANTADINE HYDROCHLORIDE 100 MG/1
100 CAPSULE, GELATIN COATED ORAL 2 TIMES DAILY
Status: DISCONTINUED | OUTPATIENT
Start: 2017-01-01 | End: 2017-01-01 | Stop reason: SINTOL

## 2017-01-01 RX ORDER — DEXTROSE AND SODIUM CHLORIDE 5; .45 G/100ML; G/100ML
INJECTION, SOLUTION INTRAVENOUS CONTINUOUS PRN
Status: DISCONTINUED | OUTPATIENT
Start: 2017-01-01 | End: 2017-01-01

## 2017-01-01 RX ORDER — MAGNESIUM SULFATE IN WATER 40 MG/ML
2 INJECTION, SOLUTION INTRAVENOUS ONCE
Status: COMPLETED | OUTPATIENT
Start: 2017-01-01 | End: 2017-01-01

## 2017-01-01 RX ORDER — NICOTINE POLACRILEX 4 MG
15 LOZENGE BUCCAL PRN
Status: DISCONTINUED | OUTPATIENT
Start: 2017-01-01 | End: 2017-01-01

## 2017-01-01 RX ORDER — M-VIT,TX,IRON,MINS/CALC/FOLIC 27MG-0.4MG
1 TABLET ORAL
Status: DISCONTINUED | OUTPATIENT
Start: 2017-01-01 | End: 2017-01-01

## 2017-01-01 RX ORDER — CISATRACURIUM BESYLATE 2 MG/ML
15 INJECTION, SOLUTION INTRAVENOUS ONCE
Status: DISCONTINUED | OUTPATIENT
Start: 2017-01-01 | End: 2017-01-01

## 2017-01-01 RX ORDER — 0.9 % SODIUM CHLORIDE 0.9 %
30 INTRAVENOUS SOLUTION INTRAVENOUS ONCE
Status: COMPLETED | OUTPATIENT
Start: 2017-01-01 | End: 2017-01-01

## 2017-01-01 RX ORDER — NITROGLYCERIN 0.4 MG/1
0.4 TABLET SUBLINGUAL EVERY 5 MIN PRN
Status: DISCONTINUED | OUTPATIENT
Start: 2017-01-01 | End: 2017-01-01

## 2017-01-01 RX ORDER — GLYCOPYRROLATE 0.2 MG/ML
0.2 INJECTION INTRAMUSCULAR; INTRAVENOUS EVERY 4 HOURS PRN
Status: DISCONTINUED | OUTPATIENT
Start: 2017-01-01 | End: 2017-01-01 | Stop reason: HOSPADM

## 2017-01-01 RX ORDER — ONDANSETRON 4 MG/1
4 TABLET, ORALLY DISINTEGRATING ORAL EVERY 8 HOURS PRN
Status: DISCONTINUED | OUTPATIENT
Start: 2017-01-01 | End: 2017-01-01

## 2017-01-01 RX ORDER — POTASSIUM CHLORIDE 20MEQ/15ML
60 LIQUID (ML) ORAL 2 TIMES DAILY
Status: DISCONTINUED | OUTPATIENT
Start: 2017-01-01 | End: 2017-01-01

## 2017-01-01 RX ORDER — POTASSIUM CHLORIDE 7.45 MG/ML
INJECTION INTRAVENOUS
Status: COMPLETED
Start: 2017-01-01 | End: 2017-01-01

## 2017-01-01 RX ORDER — FENTANYL CITRATE 50 UG/ML
25 INJECTION, SOLUTION INTRAMUSCULAR; INTRAVENOUS
Status: DISCONTINUED | OUTPATIENT
Start: 2017-01-01 | End: 2017-01-01

## 2017-01-01 RX ORDER — MORPHINE SULFATE 4 MG/ML
4 INJECTION, SOLUTION INTRAMUSCULAR; INTRAVENOUS
Status: DISCONTINUED | OUTPATIENT
Start: 2017-01-01 | End: 2017-01-01 | Stop reason: HOSPADM

## 2017-01-01 RX ORDER — SODIUM CHLORIDE 450 MG/100ML
INJECTION, SOLUTION INTRAVENOUS CONTINUOUS
Status: DISCONTINUED | OUTPATIENT
Start: 2017-01-01 | End: 2017-01-01

## 2017-01-01 RX ORDER — LORAZEPAM 2 MG/ML
2 INJECTION INTRAMUSCULAR ONCE
Status: DISCONTINUED | OUTPATIENT
Start: 2017-01-01 | End: 2017-01-01 | Stop reason: ALTCHOICE

## 2017-01-01 RX ORDER — ACETAMINOPHEN 325 MG/1
650 TABLET ORAL EVERY 4 HOURS PRN
Status: DISCONTINUED | OUTPATIENT
Start: 2017-01-01 | End: 2017-01-01

## 2017-01-01 RX ORDER — ISOSORBIDE MONONITRATE 30 MG/1
30 TABLET, EXTENDED RELEASE ORAL DAILY
Status: DISCONTINUED | OUTPATIENT
Start: 2017-01-01 | End: 2017-01-01

## 2017-01-01 RX ORDER — MIDAZOLAM HYDROCHLORIDE 1 MG/ML
2 INJECTION INTRAMUSCULAR; INTRAVENOUS PRN
Status: CANCELLED | OUTPATIENT
Start: 2017-01-01

## 2017-01-01 RX ORDER — MORPHINE SULFATE 2 MG/ML
1 INJECTION, SOLUTION INTRAMUSCULAR; INTRAVENOUS
Status: DISCONTINUED | OUTPATIENT
Start: 2017-01-01 | End: 2017-01-01 | Stop reason: HOSPADM

## 2017-01-01 RX ORDER — MORPHINE SULFATE 2 MG/ML
2 INJECTION, SOLUTION INTRAMUSCULAR; INTRAVENOUS
Status: DISCONTINUED | OUTPATIENT
Start: 2017-01-01 | End: 2017-01-01 | Stop reason: HOSPADM

## 2017-01-01 RX ORDER — SODIUM CHLORIDE 9 MG/ML
INJECTION, SOLUTION INTRAVENOUS CONTINUOUS
Status: DISCONTINUED | OUTPATIENT
Start: 2017-01-01 | End: 2017-01-01

## 2017-01-01 RX ORDER — CLOPIDOGREL BISULFATE 75 MG/1
75 TABLET ORAL DAILY
Status: DISCONTINUED | OUTPATIENT
Start: 2017-01-01 | End: 2017-01-01

## 2017-01-01 RX ORDER — LORAZEPAM 2 MG/ML
INJECTION INTRAMUSCULAR
Status: COMPLETED
Start: 2017-01-01 | End: 2017-01-01

## 2017-01-01 RX ORDER — MIDAZOLAM HYDROCHLORIDE 1 MG/ML
4 INJECTION INTRAMUSCULAR; INTRAVENOUS PRN
Status: DISCONTINUED | OUTPATIENT
Start: 2017-01-01 | End: 2017-01-01

## 2017-01-01 RX ORDER — CISATRACURIUM BESYLATE 2 MG/ML
5 INJECTION, SOLUTION INTRAVENOUS ONCE
Status: DISCONTINUED | OUTPATIENT
Start: 2017-01-01 | End: 2017-01-01

## 2017-01-01 RX ORDER — DEXTROSE MONOHYDRATE 25 G/50ML
12.5 INJECTION, SOLUTION INTRAVENOUS PRN
Status: DISCONTINUED | OUTPATIENT
Start: 2017-01-01 | End: 2017-01-01 | Stop reason: SDUPTHER

## 2017-01-01 RX ORDER — POTASSIUM CHLORIDE 7.45 MG/ML
10 INJECTION INTRAVENOUS PRN
Status: DISCONTINUED | OUTPATIENT
Start: 2017-01-01 | End: 2017-01-01

## 2017-01-01 RX ORDER — MIDAZOLAM HYDROCHLORIDE 1 MG/ML
2 INJECTION INTRAMUSCULAR; INTRAVENOUS PRN
Status: DISCONTINUED | OUTPATIENT
Start: 2017-01-01 | End: 2017-01-01 | Stop reason: HOSPADM

## 2017-01-01 RX ORDER — DEXTROSE MONOHYDRATE 50 MG/ML
100 INJECTION, SOLUTION INTRAVENOUS PRN
Status: DISCONTINUED | OUTPATIENT
Start: 2017-01-01 | End: 2017-01-01

## 2017-01-01 RX ORDER — CARBIDOPA AND LEVODOPA 50; 200 MG/1; MG/1
0.5 TABLET, EXTENDED RELEASE ORAL NIGHTLY
Status: DISCONTINUED | OUTPATIENT
Start: 2017-01-01 | End: 2017-01-01

## 2017-01-01 RX ORDER — MORPHINE SULFATE 4 MG/ML
3 INJECTION, SOLUTION INTRAMUSCULAR; INTRAVENOUS
Status: DISCONTINUED | OUTPATIENT
Start: 2017-01-01 | End: 2017-01-01 | Stop reason: HOSPADM

## 2017-01-01 RX ORDER — GLYCOPYRROLATE 0.2 MG/ML
0.2 INJECTION INTRAMUSCULAR; INTRAVENOUS EVERY 4 HOURS PRN
Status: CANCELLED | OUTPATIENT
Start: 2017-01-01

## 2017-01-01 RX ORDER — QUETIAPINE FUMARATE 25 MG/1
25 TABLET, FILM COATED ORAL 2 TIMES DAILY
Status: ON HOLD | COMMUNITY
End: 2017-01-01

## 2017-01-01 RX ORDER — MORPHINE SULFATE 2 MG/ML
2 INJECTION, SOLUTION INTRAMUSCULAR; INTRAVENOUS
Status: CANCELLED | OUTPATIENT
Start: 2017-01-01

## 2017-01-01 RX ORDER — GLIPIZIDE 5 MG/1
5 TABLET, FILM COATED, EXTENDED RELEASE ORAL DAILY
Status: ON HOLD | COMMUNITY
End: 2017-01-01

## 2017-01-01 RX ORDER — MORPHINE SULFATE 2 MG/ML
1 INJECTION, SOLUTION INTRAMUSCULAR; INTRAVENOUS
Status: CANCELLED | OUTPATIENT
Start: 2017-01-01

## 2017-01-01 RX ORDER — ATORVASTATIN CALCIUM 10 MG/1
10 TABLET, FILM COATED ORAL NIGHTLY
Status: DISCONTINUED | OUTPATIENT
Start: 2017-01-01 | End: 2017-01-01

## 2017-01-01 RX ORDER — DOCUSATE SODIUM 100 MG/1
100 CAPSULE, LIQUID FILLED ORAL 2 TIMES DAILY
Status: DISCONTINUED | OUTPATIENT
Start: 2017-01-01 | End: 2017-01-01

## 2017-01-01 RX ORDER — MORPHINE SULFATE 4 MG/ML
4 INJECTION, SOLUTION INTRAMUSCULAR; INTRAVENOUS
Status: CANCELLED | OUTPATIENT
Start: 2017-01-01

## 2017-01-01 RX ORDER — MECLIZINE HCL 12.5 MG/1
12.5 TABLET ORAL 3 TIMES DAILY PRN
Status: ON HOLD | COMMUNITY
End: 2017-01-01

## 2017-01-01 RX ORDER — DEXTROSE MONOHYDRATE 100 MG/ML
INJECTION, SOLUTION INTRAVENOUS CONTINUOUS
Status: DISCONTINUED | OUTPATIENT
Start: 2017-01-01 | End: 2017-01-01

## 2017-01-01 RX ORDER — CARBIDOPA AND LEVODOPA 25; 100 MG/1; MG/1
1 TABLET, EXTENDED RELEASE ORAL NIGHTLY
Status: DISCONTINUED | OUTPATIENT
Start: 2017-01-01 | End: 2017-01-01 | Stop reason: SDUPTHER

## 2017-01-01 RX ORDER — CISATRACURIUM BESYLATE 2 MG/ML
INJECTION, SOLUTION INTRAVENOUS
Status: COMPLETED
Start: 2017-01-01 | End: 2017-01-01

## 2017-01-01 RX ORDER — FAMOTIDINE 20 MG/1
20 TABLET, FILM COATED ORAL DAILY
Status: DISCONTINUED | OUTPATIENT
Start: 2017-01-01 | End: 2017-01-01 | Stop reason: SDUPTHER

## 2017-01-01 RX ORDER — CARBIDOPA AND LEVODOPA 25; 100 MG/1; MG/1
1 TABLET, EXTENDED RELEASE ORAL NIGHTLY
Qty: 90 TABLET | Refills: 0 | Status: ON HOLD | OUTPATIENT
Start: 2017-01-01 | End: 2017-01-01

## 2017-01-01 RX ORDER — SODIUM CHLORIDE 0.9 % (FLUSH) 0.9 %
10 SYRINGE (ML) INJECTION PRN
Status: DISCONTINUED | OUTPATIENT
Start: 2017-01-01 | End: 2017-01-01

## 2017-01-01 RX ORDER — MELATONIN
Qty: 30 TABLET | Refills: 0 | OUTPATIENT
Start: 2017-01-01

## 2017-01-01 RX ORDER — DEXTROSE MONOHYDRATE 25 G/50ML
12.5 INJECTION, SOLUTION INTRAVENOUS PRN
Status: DISCONTINUED | OUTPATIENT
Start: 2017-01-01 | End: 2017-01-01

## 2017-01-01 RX ORDER — LORAZEPAM 2 MG/ML
4 INJECTION INTRAMUSCULAR ONCE
Status: DISCONTINUED | OUTPATIENT
Start: 2017-01-01 | End: 2017-01-01

## 2017-01-01 RX ORDER — ASPIRIN 81 MG/1
81 TABLET, CHEWABLE ORAL DAILY
Status: DISCONTINUED | OUTPATIENT
Start: 2017-01-01 | End: 2017-01-01

## 2017-01-01 RX ORDER — LEVOTHYROXINE SODIUM 0.03 MG/1
25 TABLET ORAL DAILY
Status: DISCONTINUED | OUTPATIENT
Start: 2017-01-01 | End: 2017-01-01

## 2017-01-01 RX ORDER — MORPHINE SULFATE 2 MG/ML
3 INJECTION, SOLUTION INTRAMUSCULAR; INTRAVENOUS
Status: DISCONTINUED | OUTPATIENT
Start: 2017-01-01 | End: 2017-01-01 | Stop reason: HOSPADM

## 2017-01-01 RX ORDER — INSULIN GLARGINE 100 [IU]/ML
10 INJECTION, SOLUTION SUBCUTANEOUS NIGHTLY
Status: DISCONTINUED | OUTPATIENT
Start: 2017-01-01 | End: 2017-01-01 | Stop reason: SINTOL

## 2017-01-01 RX ORDER — LORAZEPAM 2 MG/ML
2 INJECTION INTRAMUSCULAR
Status: DISCONTINUED | OUTPATIENT
Start: 2017-01-01 | End: 2017-01-01

## 2017-01-01 RX ORDER — RANITIDINE 150 MG/1
150 CAPSULE ORAL EVERY EVENING
Status: DISCONTINUED | OUTPATIENT
Start: 2017-01-01 | End: 2017-01-01 | Stop reason: CLARIF

## 2017-01-01 RX ORDER — DOCUSATE SODIUM 100 MG/1
100 CAPSULE, LIQUID FILLED ORAL 2 TIMES DAILY
Status: ON HOLD | COMMUNITY
End: 2017-01-01

## 2017-01-01 RX ORDER — RANITIDINE 150 MG/1
150 CAPSULE ORAL EVERY EVENING
Status: ON HOLD | COMMUNITY
End: 2017-01-01

## 2017-01-01 RX ORDER — DEXTROSE MONOHYDRATE 25 G/50ML
25 INJECTION, SOLUTION INTRAVENOUS ONCE
Status: COMPLETED | OUTPATIENT
Start: 2017-01-01 | End: 2017-01-01

## 2017-01-01 RX ORDER — QUETIAPINE FUMARATE 25 MG/1
25 TABLET, FILM COATED ORAL 2 TIMES DAILY
Status: DISCONTINUED | OUTPATIENT
Start: 2017-01-01 | End: 2017-01-01

## 2017-01-01 RX ORDER — MORPHINE SULFATE 2 MG/ML
4 INJECTION, SOLUTION INTRAMUSCULAR; INTRAVENOUS
Status: DISCONTINUED | OUTPATIENT
Start: 2017-01-01 | End: 2017-01-01 | Stop reason: HOSPADM

## 2017-01-01 RX ADMIN — LORAZEPAM 2 MG: 2 INJECTION INTRAMUSCULAR; INTRAVENOUS at 01:45

## 2017-01-01 RX ADMIN — CISATRACURIUM BESYLATE 15 MG: 2 INJECTION INTRAVENOUS at 20:30

## 2017-01-01 RX ADMIN — LORAZEPAM 2 MG: 2 INJECTION INTRAMUSCULAR; INTRAVENOUS at 22:58

## 2017-01-01 RX ADMIN — TAZOBACTAM SODIUM AND PIPERACILLIN SODIUM 3.38 G: 375; 3 INJECTION, SOLUTION INTRAVENOUS at 11:53

## 2017-01-01 RX ADMIN — POTASSIUM CHLORIDE 10 MEQ: 10 INJECTION, SOLUTION INTRAVENOUS at 05:56

## 2017-01-01 RX ADMIN — MAGNESIUM SULFATE HEPTAHYDRATE 2 G: 40 INJECTION, SOLUTION INTRAVENOUS at 05:53

## 2017-01-01 RX ADMIN — MIDAZOLAM 10 MG/HR: 5 INJECTION INTRAMUSCULAR; INTRAVENOUS at 12:54

## 2017-01-01 RX ADMIN — Medication 10 ML: at 22:12

## 2017-01-01 RX ADMIN — TAZOBACTAM SODIUM AND PIPERACILLIN SODIUM 3.38 G: 375; 3 INJECTION, SOLUTION INTRAVENOUS at 13:06

## 2017-01-01 RX ADMIN — Medication 4 MG: at 09:30

## 2017-01-01 RX ADMIN — POTASSIUM CHLORIDE 60 MEQ: 40 SOLUTION ORAL at 20:46

## 2017-01-01 RX ADMIN — Medication 10 ML: at 20:46

## 2017-01-01 RX ADMIN — MIDAZOLAM 10 MG/HR: 5 INJECTION INTRAMUSCULAR; INTRAVENOUS at 03:21

## 2017-01-01 RX ADMIN — Medication 10 ML: at 08:37

## 2017-01-01 RX ADMIN — SODIUM CHLORIDE 1497 ML: 9 INJECTION, SOLUTION INTRAVENOUS at 14:16

## 2017-01-01 RX ADMIN — VANCOMYCIN HYDROCHLORIDE 750 MG: 1 INJECTION, POWDER, LYOPHILIZED, FOR SOLUTION INTRAVENOUS at 13:39

## 2017-01-01 RX ADMIN — DEXTROSE MONOHYDRATE: 100 INJECTION, SOLUTION INTRAVENOUS at 20:49

## 2017-01-01 RX ADMIN — FAMOTIDINE 20 MG: 10 INJECTION, SOLUTION INTRAVENOUS at 08:21

## 2017-01-01 RX ADMIN — PHENYLEPHRINE HYDROCHLORIDE 25 MCG/MIN: 10 INJECTION INTRAVENOUS at 23:15

## 2017-01-01 RX ADMIN — LORAZEPAM 2 MG: 2 INJECTION INTRAMUSCULAR; INTRAVENOUS at 09:14

## 2017-01-01 RX ADMIN — POTASSIUM CHLORIDE 10 MEQ: 10 INJECTION, SOLUTION INTRAVENOUS at 02:25

## 2017-01-01 RX ADMIN — SODIUM CHLORIDE: 4.5 INJECTION, SOLUTION INTRAVENOUS at 03:56

## 2017-01-01 RX ADMIN — POTASSIUM CHLORIDE 10 MEQ: 10 INJECTION, SOLUTION INTRAVENOUS at 08:20

## 2017-01-01 RX ADMIN — SODIUM CHLORIDE 22.8 UNITS/HR: 900 INJECTION, SOLUTION INTRAVENOUS at 14:49

## 2017-01-01 RX ADMIN — MIDAZOLAM HYDROCHLORIDE 4 MG: 1 INJECTION, SOLUTION INTRAMUSCULAR; INTRAVENOUS at 12:16

## 2017-01-01 RX ADMIN — SODIUM PHOSPHATE, MONOBASIC, MONOHYDRATE 15 MMOL: 276; 142 INJECTION, SOLUTION INTRAVENOUS at 22:46

## 2017-01-01 RX ADMIN — LORAZEPAM 2 MG: 2 INJECTION INTRAMUSCULAR; INTRAVENOUS at 08:18

## 2017-01-01 RX ADMIN — FAMOTIDINE 20 MG: 10 INJECTION, SOLUTION INTRAVENOUS at 22:12

## 2017-01-01 RX ADMIN — PHENYLEPHRINE HYDROCHLORIDE 100 MCG/MIN: 10 INJECTION INTRAVENOUS at 03:21

## 2017-01-01 RX ADMIN — MIDAZOLAM HYDROCHLORIDE 4 MG: 1 INJECTION, SOLUTION INTRAMUSCULAR; INTRAVENOUS at 14:01

## 2017-01-01 RX ADMIN — VITAMIN D, TAB 1000IU (100/BT) 1000 UNITS: 25 TAB at 08:37

## 2017-01-01 RX ADMIN — TAZOBACTAM SODIUM AND PIPERACILLIN SODIUM 3.38 G: 375; 3 INJECTION, SOLUTION INTRAVENOUS at 23:01

## 2017-01-01 RX ADMIN — MIDAZOLAM HYDROCHLORIDE 4 MG: 1 INJECTION, SOLUTION INTRAMUSCULAR; INTRAVENOUS at 01:53

## 2017-01-01 RX ADMIN — LEVOTHYROXINE SODIUM 25 MCG: 25 TABLET ORAL at 05:03

## 2017-01-01 RX ADMIN — ENOXAPARIN SODIUM 30 MG: 30 INJECTION SUBCUTANEOUS at 08:20

## 2017-01-01 RX ADMIN — PHENOBARBITAL SODIUM 497.9 MG: 65 INJECTION INTRAMUSCULAR; INTRAVENOUS at 12:33

## 2017-01-01 RX ADMIN — LORAZEPAM 2 MG: 2 INJECTION INTRAMUSCULAR; INTRAVENOUS at 03:04

## 2017-01-01 RX ADMIN — SODIUM CHLORIDE 1000 MG: 900 INJECTION, SOLUTION INTRAVENOUS at 10:54

## 2017-01-01 RX ADMIN — SODIUM CHLORIDE: 9 INJECTION, SOLUTION INTRAVENOUS at 12:28

## 2017-01-01 RX ADMIN — MIDAZOLAM 10 MG/HR: 5 INJECTION INTRAMUSCULAR; INTRAVENOUS at 16:59

## 2017-01-01 RX ADMIN — ATORVASTATIN CALCIUM 10 MG: 10 TABLET, FILM COATED ORAL at 20:46

## 2017-01-01 RX ADMIN — POTASSIUM CHLORIDE 10 MEQ: 10 INJECTION, SOLUTION INTRAVENOUS at 04:42

## 2017-01-01 RX ADMIN — POTASSIUM CHLORIDE 10 MEQ: 10 INJECTION, SOLUTION INTRAVENOUS at 21:40

## 2017-01-01 RX ADMIN — ASPIRIN 81 MG: 81 TABLET, CHEWABLE ORAL at 08:20

## 2017-01-01 RX ADMIN — MIDAZOLAM 2 MG/HR: 5 INJECTION INTRAMUSCULAR; INTRAVENOUS at 09:55

## 2017-01-01 RX ADMIN — MIDAZOLAM HYDROCHLORIDE 4 MG: 1 INJECTION, SOLUTION INTRAMUSCULAR; INTRAVENOUS at 13:40

## 2017-01-01 RX ADMIN — SODIUM CHLORIDE 32.1 UNITS/HR: 900 INJECTION, SOLUTION INTRAVENOUS at 20:47

## 2017-01-01 RX ADMIN — MIDAZOLAM HYDROCHLORIDE 4 MG: 1 INJECTION, SOLUTION INTRAMUSCULAR; INTRAVENOUS at 11:15

## 2017-01-01 RX ADMIN — SODIUM CHLORIDE 1000 MG: 9 INJECTION, SOLUTION INTRAVENOUS at 23:19

## 2017-01-01 RX ADMIN — SODIUM CHLORIDE: 4.5 INJECTION, SOLUTION INTRAVENOUS at 20:20

## 2017-01-01 RX ADMIN — POTASSIUM CHLORIDE 60 MEQ: 40 SOLUTION ORAL at 03:24

## 2017-01-01 RX ADMIN — ENOXAPARIN SODIUM 30 MG: 30 INJECTION SUBCUTANEOUS at 11:46

## 2017-01-01 RX ADMIN — PHENYLEPHRINE HYDROCHLORIDE 100 MCG/MIN: 10 INJECTION INTRAVENOUS at 17:58

## 2017-01-01 RX ADMIN — PHENYLEPHRINE HYDROCHLORIDE 100 MCG/MIN: 10 INJECTION INTRAVENOUS at 09:51

## 2017-01-01 RX ADMIN — SODIUM CHLORIDE 1000 MG: 900 INJECTION, SOLUTION INTRAVENOUS at 22:38

## 2017-01-01 RX ADMIN — POTASSIUM CHLORIDE 10 MEQ: 10 INJECTION, SOLUTION INTRAVENOUS at 21:33

## 2017-01-01 RX ADMIN — MIDAZOLAM 10 MG/HR: 5 INJECTION INTRAMUSCULAR; INTRAVENOUS at 23:43

## 2017-01-01 RX ADMIN — Medication 2 MG: at 22:58

## 2017-01-01 RX ADMIN — DEXTROSE MONOHYDRATE: 100 INJECTION, SOLUTION INTRAVENOUS at 10:56

## 2017-01-01 RX ADMIN — Medication 37.4 UNITS/HR: at 03:54

## 2017-01-01 RX ADMIN — SODIUM CHLORIDE: 4.5 INJECTION, SOLUTION INTRAVENOUS at 04:45

## 2017-01-01 RX ADMIN — CLOPIDOGREL 75 MG: 75 TABLET, FILM COATED ORAL at 08:20

## 2017-01-01 RX ADMIN — POTASSIUM CHLORIDE 10 MEQ: 10 INJECTION, SOLUTION INTRAVENOUS at 22:19

## 2017-01-01 RX ADMIN — MIDAZOLAM 10 MG/HR: 5 INJECTION INTRAMUSCULAR; INTRAVENOUS at 15:56

## 2017-01-01 RX ADMIN — LORAZEPAM 2 MG: 2 INJECTION INTRAMUSCULAR; INTRAVENOUS at 23:47

## 2017-01-01 RX ADMIN — AZITHROMYCIN MONOHYDRATE 500 MG: 500 INJECTION, POWDER, LYOPHILIZED, FOR SOLUTION INTRAVENOUS at 15:30

## 2017-01-01 RX ADMIN — POTASSIUM CHLORIDE 10 MEQ: 10 INJECTION, SOLUTION INTRAVENOUS at 06:54

## 2017-01-01 RX ADMIN — POTASSIUM CHLORIDE 10 MEQ: 10 INJECTION, SOLUTION INTRAVENOUS at 00:21

## 2017-01-01 RX ADMIN — Medication 40.6 UNITS/HR: at 22:50

## 2017-01-01 RX ADMIN — POTASSIUM CHLORIDE 10 MEQ: 10 INJECTION, SOLUTION INTRAVENOUS at 20:57

## 2017-01-01 RX ADMIN — Medication 10 ML: at 08:21

## 2017-01-01 RX ADMIN — SODIUM CHLORIDE: 9 INJECTION, SOLUTION INTRAVENOUS at 20:59

## 2017-01-01 RX ADMIN — Medication 42.4 UNITS/HR: at 01:53

## 2017-01-01 RX ADMIN — CARBIDOPA AND LEVODOPA 1 TABLET: 25; 100 TABLET ORAL at 08:36

## 2017-01-01 RX ADMIN — SODIUM CHLORIDE: 9 INJECTION, SOLUTION INTRAVENOUS at 10:55

## 2017-01-01 RX ADMIN — LORAZEPAM 2 MG: 2 INJECTION INTRAMUSCULAR; INTRAVENOUS at 06:09

## 2017-01-01 RX ADMIN — MIDAZOLAM HYDROCHLORIDE 4 MG: 1 INJECTION, SOLUTION INTRAMUSCULAR; INTRAVENOUS at 19:02

## 2017-01-01 RX ADMIN — SODIUM CHLORIDE 500 MG: 900 INJECTION, SOLUTION INTRAVENOUS at 23:49

## 2017-01-01 RX ADMIN — LEVOTHYROXINE SODIUM 25 MCG: 25 TABLET ORAL at 08:36

## 2017-01-01 RX ADMIN — AMANTADINE HYDROCHLORIDE 100 MG: 100 CAPSULE ORAL at 08:37

## 2017-01-01 RX ADMIN — DEXTROSE MONOHYDRATE 25 G: 25 INJECTION, SOLUTION INTRAVENOUS at 10:56

## 2017-01-01 RX ADMIN — SODIUM CHLORIDE: 4.5 INJECTION, SOLUTION INTRAVENOUS at 20:48

## 2017-01-01 RX ADMIN — SODIUM CHLORIDE 1000 MG: 900 INJECTION, SOLUTION INTRAVENOUS at 11:06

## 2017-01-01 RX ADMIN — PHENOBARBITAL SODIUM 497.9 MG: 65 INJECTION INTRAMUSCULAR; INTRAVENOUS at 14:17

## 2017-01-01 RX ADMIN — Medication 18 UNITS/HR: at 08:18

## 2017-01-01 RX ADMIN — ASPIRIN 81 MG: 81 TABLET, CHEWABLE ORAL at 11:47

## 2017-01-01 RX ADMIN — MIDAZOLAM HYDROCHLORIDE 4 MG: 1 INJECTION, SOLUTION INTRAMUSCULAR; INTRAVENOUS at 11:01

## 2017-01-01 RX ADMIN — MIDAZOLAM HYDROCHLORIDE 4 MG: 1 INJECTION, SOLUTION INTRAMUSCULAR; INTRAVENOUS at 15:40

## 2017-01-01 RX ADMIN — POTASSIUM CHLORIDE 10 MEQ: 10 INJECTION, SOLUTION INTRAVENOUS at 09:10

## 2017-01-01 RX ADMIN — TAZOBACTAM SODIUM AND PIPERACILLIN SODIUM 3.38 G: 375; 3 INJECTION, SOLUTION INTRAVENOUS at 23:50

## 2017-01-01 RX ADMIN — POTASSIUM CHLORIDE 10 MEQ: 10 INJECTION, SOLUTION INTRAVENOUS at 01:00

## 2017-01-01 RX ADMIN — SODIUM PHOSPHATE, MONOBASIC, MONOHYDRATE 15 MMOL: 276; 142 INJECTION, SOLUTION INTRAVENOUS at 10:12

## 2017-01-01 RX ADMIN — FAMOTIDINE 20 MG: 10 INJECTION, SOLUTION INTRAVENOUS at 08:37

## 2017-01-01 RX ADMIN — POTASSIUM CHLORIDE 10 MEQ: 10 INJECTION, SOLUTION INTRAVENOUS at 03:24

## 2017-01-01 RX ADMIN — MORPHINE SULFATE 2 MG: 2 INJECTION, SOLUTION INTRAMUSCULAR; INTRAVENOUS at 14:40

## 2017-01-01 RX ADMIN — POTASSIUM CHLORIDE 10 MEQ: 10 INJECTION, SOLUTION INTRAVENOUS at 22:20

## 2017-01-01 RX ADMIN — Medication 5 MCG/MIN: at 20:20

## 2017-01-01 RX ADMIN — DEXTROSE AND SODIUM CHLORIDE: 5; 450 INJECTION, SOLUTION INTRAVENOUS at 06:10

## 2017-01-01 RX ADMIN — SODIUM CHLORIDE: 4.5 INJECTION, SOLUTION INTRAVENOUS at 01:56

## 2017-01-01 RX ADMIN — POTASSIUM CHLORIDE 10 MEQ: 10 INJECTION, SOLUTION INTRAVENOUS at 23:30

## 2017-01-01 RX ADMIN — CLOPIDOGREL 75 MG: 75 TABLET, FILM COATED ORAL at 11:47

## 2017-01-01 RX ADMIN — SODIUM BICARBONATE 50 MEQ: 84 INJECTION, SOLUTION INTRAVENOUS at 13:00

## 2017-01-01 RX ADMIN — CISATRACURIUM BESYLATE 5 MG: 2 INJECTION INTRAVENOUS at 20:34

## 2017-01-01 RX ADMIN — MIDAZOLAM HYDROCHLORIDE 4 MG: 1 INJECTION, SOLUTION INTRAMUSCULAR; INTRAVENOUS at 11:18

## 2017-01-01 RX ADMIN — SODIUM CHLORIDE: 4.5 INJECTION, SOLUTION INTRAVENOUS at 23:36

## 2017-01-01 RX ADMIN — Medication: at 15:27

## 2017-01-01 RX ADMIN — PHENOBARBITAL SODIUM 995.8 MG: 65 INJECTION INTRAMUSCULAR; INTRAVENOUS at 16:57

## 2017-01-01 RX ADMIN — VITAMIN D, TAB 1000IU (100/BT) 1000 UNITS: 25 TAB at 08:20

## 2017-01-01 RX ADMIN — POTASSIUM CHLORIDE 10 MEQ: 10 INJECTION, SOLUTION INTRAVENOUS at 23:19

## 2017-01-01 RX ADMIN — SODIUM CHLORIDE: 9 INJECTION, SOLUTION INTRAVENOUS at 16:31

## 2017-01-01 RX ADMIN — VANCOMYCIN HYDROCHLORIDE 750 MG: 1 INJECTION, POWDER, LYOPHILIZED, FOR SOLUTION INTRAVENOUS at 17:45

## 2017-01-01 RX ADMIN — DEXTROSE MONOHYDRATE: 100 INJECTION, SOLUTION INTRAVENOUS at 10:01

## 2017-01-01 RX ADMIN — Medication 6.3 UNITS/HR: at 21:36

## 2017-01-01 ASSESSMENT — PULMONARY FUNCTION TESTS
PIF_VALUE: 7.8
PIF_VALUE: 22
PIF_VALUE: 22
PIF_VALUE: 24
PIF_VALUE: 26
PIF_VALUE: 20
PIF_VALUE: 25
PIF_VALUE: 25
PIF_VALUE: 13
PIF_VALUE: 25
PIF_VALUE: 27
PIF_VALUE: 23
PIF_VALUE: 24

## 2017-02-06 PROBLEM — Z78.9 POOR TOLERANCE FOR AMBULATION: Status: ACTIVE | Noted: 2017-01-01

## 2017-02-06 PROBLEM — R42 DIZZINESS: Status: ACTIVE | Noted: 2017-01-01

## 2017-02-06 PROBLEM — R25.1 TREMOR: Status: ACTIVE | Noted: 2017-01-01

## 2017-02-10 PROBLEM — I95.1 ORTHOSTATIC HYPOTENSION: Status: ACTIVE | Noted: 2017-01-01

## 2017-02-10 PROBLEM — E55.9 VITAMIN D DEFICIENCY: Chronic | Status: ACTIVE | Noted: 2017-01-01

## 2017-03-14 PROBLEM — E11.21 TYPE 2 DIABETES MELLITUS WITH DIABETIC NEPHROPATHY (HCC): Status: ACTIVE | Noted: 2017-01-01

## 2017-03-14 PROBLEM — N17.9 AKI (ACUTE KIDNEY INJURY) (HCC): Status: ACTIVE | Noted: 2017-01-01

## 2017-10-31 NOTE — ED NOTES
Patient presented to ED by [facility]. Patient [facility] came to see patient and found patient face down in cat litter box. [facility] called squad. Patient was unresponsive. Patient was nasally intubated in the squad. Patient presents covered in feces. Cold. With multiple abrasions throughout. No family at bedside.         [address], RN  10/31/17 3573

## 2017-10-31 NOTE — ED NOTES
Patient has a red open wound on right side of lower neck. It is reddened with edges that are not intact and greenish discharge. Patient has 3 skin tears on right wrist. Coccyx was reddened. Bruising throughout.       Med Grant RN  10/31/17 5314

## 2017-10-31 NOTE — ED PROVIDER NOTES
hours as needed for Pain or Fever    AMANTADINE (SYMMETREL) 100 MG CAPSULE    Take 1 capsule by mouth 2 times daily    ASPIRIN 325 MG EC TABLET    Take 1 tablet by mouth daily    ATORVASTATIN (LIPITOR) 10 MG TABLET    Take 1 tablet by mouth nightly    CARBIDOPA-LEVODOPA (SINEMET CR)  MG PER EXTENDED RELEASE TABLET    Take 1 tablet by mouth nightly    CARBIDOPA-LEVODOPA (SINEMET)  MG PER TABLET    Take 1 tablet by mouth 4 times daily    CLOPIDOGREL (PLAVIX) 75 MG TABLET    Take 1 tablet by mouth daily    DOCUSATE SODIUM (COLACE) 100 MG CAPSULE    Take 100 mg by mouth 2 times daily    FAMOTIDINE (PEPCID) 20 MG TABLET    Take 1 tablet by mouth daily    GLIPIZIDE (GLUCOTROL XL) 5 MG EXTENDED RELEASE TABLET    Take 5 mg by mouth daily    INSULIN GLARGINE (LANTUS) 100 UNIT/ML INJECTION VIAL    Inject 10 Units into the skin nightly    INSULIN LISPRO (HUMALOG) 100 UNIT/ML INJECTION VIAL    Inject 0-3 Units into the skin nightly    INSULIN LISPRO (HUMALOG) 100 UNIT/ML INJECTION VIAL    Inject 0-6 Units into the skin 3 times daily (with meals)    ISOSORBIDE MONONITRATE (IMDUR) 30 MG EXTENDED RELEASE TABLET    Take 30 mg by mouth daily    LEVOTHYROXINE (SYNTHROID) 25 MCG TABLET    Take 1 tablet by mouth Daily    MECLIZINE (ANTIVERT) 12.5 MG TABLET    Take 12.5 mg by mouth 3 times daily as needed    METOPROLOL TARTRATE (LOPRESSOR) 25 MG TABLET    Take 1 tablet by mouth 2 times daily    MULTIPLE VITAMINS-MINERALS (THERAPEUTIC MULTIVITAMIN-MINERALS) TABLET    Take 1 tablet by mouth daily (with breakfast)    NITROGLYCERIN (NITROSTAT) 0.4 MG SL TABLET    Place 1 tablet under the tongue every 5 minutes as needed for Chest pain    ONDANSETRON (ZOFRAN-ODT) 4 MG DISINTEGRATING TABLET    Take 1 tablet by mouth every 8 hours as needed for Nausea or Vomiting    QUETIAPINE (SEROQUEL) 25 MG TABLET    Take 25 mg by mouth 2 times daily    RANITIDINE (ZANTAC) 150 MG CAPSULE    Take 150 mg by mouth every evening    VITAMIN D (CHOLECALCIFEROL) 1000 UNIT TABS TABLET    Take 1 tablet by mouth daily       ALLERGIES     has No Known Allergies. FAMILY HISTORY     indicated that her mother is . She indicated that her father is . family history includes High Blood Pressure in her father; Stroke in her father and mother. SOCIAL HISTORY      reports that she has never smoked. She has never used smokeless tobacco. She reports that she does not drink alcohol or use drugs. PHYSICAL EXAM     INITIAL VITALS:  height is 5' 4\" (1.626 m) and weight is 110 lb (49.9 kg). Her core temperature is 91.8 °F (33.2 °C) (abnormal). Her blood pressure is 96/48 (abnormal) and her pulse is 84. Her respiration is 22 and oxygen saturation is 100%. Physical Exam   HENT:   Head: Normocephalic and atraumatic. Right Ear: External ear normal.   Left Ear: External ear normal.   Patient is minimally responsive   Eyes:   Pupils are very sluggish   Cardiovascular: Normal rate, regular rhythm and normal heart sounds. Pulmonary/Chest:   Mechanical ventilation   Abdominal: Soft. Bowel sounds are normal.   Genitourinary:   Genitourinary Comments: Urinary and fecal incontinence   Neurological:   Not responsive  Slowed deep tendon reflexes   Skin:   Skin is cold to the touch   Nursing note and vitals reviewed. DIFFERENTIAL DIAGNOSIS:   Intracranial hemorrhage  Acute MI  Seizure      DIAGNOSTIC RESULTS     EKG: All EKG's are interpreted by the Emergency Department Physician who either signs or Co-signs this chart in the absence of a cardiologist.  EKG interpreted by Herrera Cross MD:    Normal sinus rhythm with right bundle branch block ventricular rate 87 AR interval 134 QRS duration 138 QTc is 572. When compared with prior EKG from 2017 and there are no acute changes noted except for widening of the QRS and V3 which may be related to lead placement as the other leads pretty unremarkable.     RADIOLOGY: non-plain film images(s) such as CT, Ultrasound and MRI are read by the radiologist.    CT head without contrast   Final Result    No evidence of an acute process. No change from prior. **This report has been created using voice recognition software. It may contain minor errors which are inherent in voice recognition technology. **      Final report electronically signed by Dr. Mark Perea on 10/31/2017 1:10 PM      XR Chest Portable   Final Result   1. ET tube as described. 2. Chronic changes with no definite acute findings. **This report has been created using voice recognition software. It may contain minor errors which are inherent in voice recognition technology. **      Final report electronically signed by Dr. Juan Bernal on 10/31/2017 12:32 PM      XR Chest Portable    (Results Pending)       LABS:   Labs Reviewed   LACTIC ACID, PLASMA - Abnormal; Notable for the following:        Result Value    Lactic Acid 2.9 (*)     All other components within normal limits   PROCALCITONIN - Abnormal; Notable for the following:     Procalcitonin 2.94 (*)     All other components within normal limits   CBC WITH AUTO DIFFERENTIAL - Abnormal; Notable for the following:     WBC 21.9 (*)     MCHC 30.4 (*)     RDW 15.1 (*)     MPV 10.6 (*)     Segs Absolute 17.3 (*)     Monocytes # 2.4 (*)     All other components within normal limits   BASIC METABOLIC PANEL - Abnormal; Notable for the following:     Chloride 85 (*)     CO2 6 (*)     Glucose 1,198 (*)      (*)     CREATININE 4.1 (*)     All other components within normal limits   TROPONIN - Abnormal; Notable for the following:     Troponin T 0.218 (*)     All other components within normal limits   BRAIN NATRIURETIC PEPTIDE - Abnormal; Notable for the following:     Pro-BNP 7659.0 (*)     All other components within normal limits   BETA-HYDROXYBUTYRATE - Abnormal; Notable for the following:     Beta-Hydroxybutyrate 130.01 (*)     All other components within normal limits   URINE WITH REFLEXED MICRO - Abnormal; Notable for the following:     Glucose, Ur >= 1000 (*)     Ketones, Urine 40 (*)     All other components within normal limits   ANION GAP - Abnormal; Notable for the following: Anion Gap 49.0 (*)     All other components within normal limits   GLOMERULAR FILTRATION RATE, ESTIMATED - Abnormal; Notable for the following:     Est, Glom Filt Rate 11 (*)     All other components within normal limits   OSMOLALITY - Abnormal; Notable for the following:     Osmolality Calc 375.2 (*)     All other components within normal limits   CULTURE BLOOD #1   CULTURE BLOOD #2   MANUAL DIFFERENTIAL   LACTIC ACID, PLASMA   BLOOD GAS, ARTERIAL   HEMOGLOBIN A1C       EMERGENCY DEPARTMENT COURSE:   Vitals:    Vitals:    10/31/17 1307 10/31/17 1311 10/31/17 1346 10/31/17 1414   BP: (!) 119/55  (!) 85/52 (!) 96/48   Pulse: 93   84   Resp:  18  22   Temp: (!) 90.9 °F (32.7 °C)  (!) 91.2 °F (32.9 °C) (!) 91.8 °F (33.2 °C)   TempSrc: Core   Core   SpO2: 100% 100%  100%   Weight:       Height:           MDM    12:17 PM: The patient was seen and evaluated. 12:37 PM: Patient seen and reevaluated. I received a call from her above brother Valeda Boas phone number [phone]. He said he hasn't had contact with her past 4-5 years. .  Patient does not have any family according to him. He says that he is the only living contact. He says that he was not aware about any desire on her part regarding her resuscitation. He says that they never discussed. He says at this point everything should be done for her. Respiratory therapist informed me of patient's ABG results which included pH 7.17 PCO2 13.2 PO2 82.4 bicarb 5 base excess -22.2. Glucose was found to be greater than 700. Blood pressure has improved with fluid bolus now to 99/54. Temperature has also increased.     CRITICAL CARE:   CRITICAL CARE: There was a high probability of clinically significant/life threatening deterioration in this patient's condition which required my urgent intervention. Total critical care time was 45 minutes. This excludes any time for separately reportable procedures. CONSULTS:  2:49 PM: Spoke with the intensivist on call who agreed to accept the patient. He recommended sodium bicarbonate drip. PROCEDURES:  Central Line  Date/Time: 10/31/2017 2:42 PM  Performed by: Candelario Forman  Authorized by: Candelario Forman     Consent:     Consent obtained:  Emergent situation  Pre-procedure details:     Hand hygiene: Hand hygiene performed prior to insertion      Sterile barrier technique: All elements of maximal sterile technique followed      Skin preparation:  2% chlorhexidine    Skin preparation agent: Skin preparation agent completely dried prior to procedure    Anesthesia (see MAR for exact dosages): Anesthesia method:  None  Procedure details:     Location:  R femoral    Patient position:  Flat    Procedural supplies:  Triple lumen    Catheter size:  7.5 Fr    Landmarks identified: yes      Ultrasound guidance: yes      Sterile ultrasound techniques: Sterile gel and sterile probe covers were used      Number of attempts:  2    Successful placement: yes    Post-procedure details:     Post-procedure:  Dressing applied and line sutured    Assessment:  Blood return through all ports, free fluid flow and placement verified by x-ray    Patient tolerance of procedure: Tolerated well, no immediate complications           FINAL IMPRESSION      1. PEDRITO (acute kidney injury) ([address]    2. Syncope and collapse    3. Elevated troponin    4. Elevated brain natriuretic peptide (BNP) level    5. Metabolic acidosis, increased anion gap    6. Diabetic ketoacidosis with coma associated with type 2 diabetes mellitus ([address]          DISPOSITION/PLAN   Admit to intensivist.    PATIENT REFERRED TO:  No follow-up provider specified.     DISCHARGE MEDICATIONS:  New Prescriptions    No medications on file

## 2017-11-01 PROBLEM — A41.9 SEPSIS (HCC): Status: ACTIVE | Noted: 2017-01-01

## 2017-11-01 PROBLEM — E11.10 DKA (DIABETIC KETOACIDOSES): Status: ACTIVE | Noted: 2017-01-01

## 2017-11-01 PROBLEM — L03.113 CELLULITIS OF RIGHT UPPER LIMB: Status: ACTIVE | Noted: 2017-01-01

## 2017-11-01 PROBLEM — S11.91XA LACERATION OF NECK: Status: ACTIVE | Noted: 2017-01-01

## 2017-11-01 PROBLEM — E11.01 HYPEROSMOLAR (NONKETOTIC) COMA (HCC): Status: ACTIVE | Noted: 2017-01-01

## 2017-11-01 NOTE — H&P
been in  contact with the patient for  approximately 5 years and that he was the only living contact. His  phone number is [phone]. His code status could not be disclosed  with the brother, he did not know. The patient's ABGs in the  emergency room showed a pH of 7.17, CO2 of 13, pO2 82, bicarb of 5,  base excess -22, blood sugar more than 700. She was put on a sodium  bicarbonate drip. Triple lumen catheter was inserted in the right  femoral vein. The diagnosis was syncope, collapse, elevated  troponins, elevated BNP, metabolic acidosis, diabetic ketoacidosis,  type 2 diabetes, was to be admitted to ICU. ER physician's dictation  was 12:49 p.m. It was until about just after 7:00 p.m. I was called,  but the patient had just been transferred to the floor. The patient  is nasally intubated. My first job is to remove that tube and insert  a proper orotracheal tube because nasotracheal tubes are smaller and  they can cause epistaxis and sinusitis. I evaluated the patient  first.  She was in a coma. Both pupils were about 4 mm in size,  horizontal nystagmus, no response to painful stimulation. Head  examination, she has no swelling in the face. Oropharynx and  dentition is awful with almost pus and infected with dirt coming out  of the front teeth and the back teeth as well. The incisors, the  premolars and molars with all caries with halitosis. Soft and hard  palate were normal.  Neck, no stiffness or rigidity. There is a deep  laceration across the neck following the line where she could have a  necklace around her neck measuring about 4 inches long. Deep wound  does not look very new, could have been there for a few days. About  half a millimeter deep, not bleeding, linear scar from almost from the  manubrium sternum back near the jaw, interrupted in the middle and  continuous again. Chest wall palpation and percussion, diminished  breath sounds both lung bases.   Heart palpation, percussion,  auscultation is normal.  Vesicular breath sounds only. Abdomen was  full, soft, nontender. Spleen, liver, and kidney not palpable. No  hernia. Genital exam in the presence of the nurse did not reveal  anything abnormal.  Ruiz catheter in place. Musculoskeletal system,  she has a red area in the volar aspect of her right wrist.  It is red,  measures about 5 inches x 3 inches across the wrist to the proximal  forearm. It seems fresh, is warm, is tender, looks like cellulitis. A little bit of that on the dorsum of the wrist.  Shoulders, elbows,  wrists and fingers with full range of movement passively in all  directions. Hips, there is bruising over the right hip. Lateral  aspect of the hip seems to be a little older. The bruising over the  right wrist looks like a friction carpet burn. Vascular system,  radial, ulnar, dorsalis pedis, posterior tibial pulses are normal.   Back examination when the patient was turned, there are no bruises on  the back. No tenderness. No dipping in the back. Buttocks appear  normal.  No pressure ulcers. Just all right lateral thigh scrape  wound like a bruise from the carpet, just a suggestion. Lymphatic  system, no cervical, inguinal or axillary lymphadenopathy. Ears, no  hearing loss. External auditory canal and eardrums are normal.  No  perforation. Endocrine system, no thyromegaly. Neurological system  cannot be examined, the patient is in a coma. LABORATORY DATA:  Initial labs on this patient, proBNP 7650, troponin  T 0.218, beta-hydroxybutyric acid 130, glucose 1998, sodium 140,  potassium 4.5, chloride 85, CO2 6, , anion gap 49,  procalcitonin 2.95. The patient is in full-blown diabetic  ketoacidosis. White cell count 21.9, hemoglobin 14, hematocrit 45,  segments 79%. Urinalysis, glucose more than 1000. ABGs, pH 7.12, CO2  16, O2 103, bicarb 5, base excess -22. Saturation, respiratory rate  14, PEEP of 5.     They did a CAT scan of the head which was normal.  I ordered a CAT  scan of the right hip, the neck, the right wrist, and the hips because  of the fall. Chest x-ray shows ET tube. PAST MEDICAL HISTORY:  Type 2 diabetes, coronary artery bypass graft  x4, congestive heart failure, mitral valve insufficiency,  hypertension, hyperlipidemia, chronic kidney disease. SOCIAL HISTORY:  Does not smoke. Does not drink. PAST SURGERIES:  Vascular surgery, coronary bypass graft, angioplasty  with stent. ASSESSMENT:  The patient is in full-blown diabetic ketoacidosis. She  is hyperosmolar. The osmolality determines blood flow. Very high  osmolalities are linked with high mortality, because the patient can  easily get a stroke, is 377, the limit of osmolality is 295. This is  a critical number for this patient, is caused by the diabetic  ketoacidosis and dehydration and will limit blood flow to all the  major organs. Her creatinine is 3.0. She is in acute kidney injury  from dehydration. Her procalcitonin is 2.9. She has an infection,  most likely urinary tract infection and pneumonia. Urinalysis was  negative. The most likely cause is aspiration pneumonia. She may  have rhabdomyolysis because she fell down, had CPK elevation causing  the renal failure. So, I ordered a creatinine. The creatinine kinase  has not been done, but she does have myoglobin in the urine. The  patient is in full-blown DKA. The abrasions on her neck, probably she  had a bracelet or something around her neck and she fell and it pulled  and gave her laceration, or the causes cannot be identified at this  time. The patient is in coma, it is diabetic ketoacidosis with coma. She may have neuroleptic malignant syndrome from these antipsychotic  medications and she is also on anti-lipids which will increase her  CPK. Because she fell and was unsupervised, a C-spine fracture must  be ruled out. She has elevated troponins _____ CPK.   She had coronary  artery disease in the past.  She most likely has pneumonia. PLAN:  1. Change the ET tube. I gave the patient 2 mg of Nimbex, exchanged  the ET tube at first attempt to a size 7.5 ET tube. The epiglottis  was swollen. It was successfully changed at first attempt to 23 cm,  positive CO2 change. Chest x-ray showed normal position of the ET  tube and the orogastric tube, and some mild infiltrates in the left  lower lobe. She had some blood coming out of the GI tube. Start  Protonix 40 mg q.12 or Pepcid q.12 if we do not have Protonix. Broad-spectrum antibiotics to cover all organisms. Zithromax 500 mg  daily, vancomycin 1 gm daily, Zosyn 3.375 gm daily. Diabetic  ketoacidosis with hyperosmolar state. The patient will be hydrated  with the normal saline at 500 mL an hour for 3 liters and then 200 mL  an hour. She started having seizures most likely due to hyperosmolar  state. She was given Keppra 500 mg q.12 and Ativan. We will consult  Neurology. The patient's osmolality must be brought down. Osmolalities determined by BUN, glucose, and so we cannot give the  patient any normal saline. We will give her 0.45 normal saline and  support the blood pressure with Levophed. ABGs should be done more  frequently. Her sodium bicarbonate drip should be stopped once the pH  crosses 7.15 and the bicarb above 15 to prevent paradoxical brain  acidemia. We just repeated some labs on the patient this morning. Sodium 144, potassium 3.4, up from 2.5, CO2 104, bicarb is now 918,  BUN is 85, creatinine 3.0, down from 93 and 3.3, anion gap is 22, down  from 25. Ionized calcium 1.01, osmolality is 328, is coming down  nicely. Glucose is 308. White cell count 14,000, down from 21,000. Hemoglobin is 10, down from 14 from dehydration. Hematocrit is 30. I  have not seen the CAT scans that were done. CAT scan of the neck, no  evidence of inflammatory process.   CAT scan of the soft tissue of the  neck, I did it because the epiglottis was swollen. The fluid in the  vallecula and around the endotracheal tube at the level of the vocal  cord probably related to the pressure of the cord. X-rays of the  wrist shows no wrist fractures. Critical care time spent on this patient was 2 hours from 7:30 to  9:30. The intensivist will follow the patient. The patient must not  receive normal saline until the osmolality comes down to below 300. She is in a glucose stabilized arrangement.         Michaela Welch MD, [facility]    D: 11/01/2017 5:48:25       T: 11/01/2017 9:58:09     [email or web address]  Job#: [id number]     Doc#: [id number]

## 2017-11-01 NOTE — PROGRESS NOTES
Intensive Care Unit  Intensivist Progress Note    Patient: Francisco Ellsworth  : 1950  MRN#: [id number]  2017 11:33 AM  ADMISSION DAY:  10/31/2017 11:58 AM            Wt Readings from Last 3 Encounters:   10/31/17 109 lb 12.6 oz (49.8 kg)   17 110 lb (49.9 kg)   17 115 lb 3.2 oz (52.3 kg)      Body mass index is 18.85 kg/m². 77 yo  female   PMHx and problems:  - CAD   - S/P CABG x 5 by Dr. Shin Ness on 16, Grafts include  LIMA to LAD, RSVG to OM1 and RSVG to OM2.  - Essential hypertension  - CKD Stage 3  - PEDRITO superimposed on CKD   Multifactorial   - Volume depletion at presentation - hyperglycemic diuresis?    - Hypotension/ATN   - Rhabdomyolysis  - Diastolic dysfunction CHF  - Severe mitral regurgitation  - Cardiorenal syndrome  - Osteoarthritis/DJD  - Treated hypothyroidism  - Dyslipidemia   - Elevated Cholesterol, low HDL, hypertriglyceridemia   - May represent uncontrolled DM  - DM Type 2   - IDDM - out of control  - Diabetic ketoacidosis with hyperosmolar coma   - Severe hyperglycemia, DM out of control on presentation, marked AG metabolic acidosis with severe ketonemia  - Emesis with suspected aspiration of gastric contents  - Acute respiratory failure with hypoxemia referable to above  - Pressure ulcer right UE at presentation  - History of right neck laceration, now healing  - New onset seizure disorder   - Suspected due to severe hyperosmolar state of unknown duration with rapid glucose reduction   - CT Brain (-) for acute appearing changes  - Rhabdomyolysis   - Likely referable to static state (lack of movement) of unknown duration at home  - Troponin elevation   - Suspected acute cardiac injury - may be referable to global hypoxemia,severe acidemia   - Recurrent coronary occlusion cannot be excluded   - Element PEDRITO superimposed on CKD possible also  - Parkinson's Disease  - Hypovitaminosis D (Treated)  - Hypomagnesemia, hypocalcemia, hypophosphatemia, hypokalemia    Data is limited. Pt apparently did not answer door when home nurse attempted to see and EMS called. Per ED note, found unresponsive, face down in cat litter and with evidence of marked emesis. Upper airway was reportedly so involved with vomitus that EMS felt nasal ETT placement was necessary and this was accomplished. (ETT later changed to oral route by Dr Marysol Thornton here) Pt brought to ED, found to be SEVERELY hyperglycemic with marked AG metabolic acidosis, marked beta-hydroxybutyrate elevation. She was placed on insulin IV and mechanical ventilation. She was severely acidemic by ABG at 1232 hrs yesterday, however ED staff did not follow-up on this and no additional studies were done in this regard for ~ 7.5 hrs. Pt not brought to ICU until ~ 8:00 pm for undefined reasons and I was not made aware of her, although Dr Dana Patel apparently was. Overnight the patient has continued to display seizure like activity and has been placed on IV midazolam along with escalating doses of levetiracetam. Her chart notes no prior seizure history. The pt was placed on IV insulin per DKA protocol, however glucose has fallen rapidly and there is concern this may be contributory to seizure activity. There is no H&P/admit note on chart, however multiple ED notes present and reviewed. Examination - on midazolam 2 mg/hr infusion  General/Neuro - Non-interactive. Pupils ~ 2 mmOU with slight reaction to light. No eyelid blink or gaze aversion from bright light. No abnl eye movements currently, however prior to recent IV benzodiazepine bolus, pt has repetitive facial contractions, eyelid blinks, tonic-clonic neck movements, now absent. Pt has (+) resp efforts > set vent rate,  Minimal cough on deep ETT suction, no gag reflex with deep pharyngeal stimulation. There is mildly increased extensor tone left UE. There is equivocal extension left UE to nail pressure, no movement right UE, up-going toes with flaring to bilateral toenail pressure. vial Inject 10 Units into the skin nightly 3/1/17   insulin lispro (HUMALOG) 100 UNIT/ML injection vial Inject 0-3 Units into the skin nightly 3/1/17   insulin lispro (HUMALOG) 100 UNIT/ML injection vial Inject 0-6 Units into the skin 3 times daily (with meals) 3/1/17   ondansetron (ZOFRAN-ODT) 4 MG disintegrating tablet Take 1 tablet by mouth every 8 hours as needed for Nausea or Vomiting 3/1/17   amantadine (SYMMETREL) 100 MG capsule Take 1 capsule by mouth 2 times daily 3/1/17   famotidine (PEPCID) 20 MG tablet Take 1 tablet by mouth daily 3/1/17   vitamin D (CHOLECALCIFEROL) 1000 UNIT TABS tablet Take 1 tablet by mouth daily 3/1/17   isosorbide mononitrate (IMDUR) 30 MG extended release tablet Take 30 mg by mouth daily    aspirin 325 MG EC tablet Take 1 tablet by mouth daily 10/7/16   nitroGLYCERIN (NITROSTAT) 0.4 MG SL tablet Place 1 tablet under the tongue every 5 minutes as needed for Chest pain 10/7/16   atorvastatin (LIPITOR) 10 MG tablet Take 1 tablet by mouth nightly 10/7/16   metoprolol tartrate (LOPRESSOR) 25 MG tablet Take 1 tablet by mouth 2 times daily 10/7/16   clopidogrel (PLAVIX) 75 MG tablet Take 1 tablet by mouth daily 10/7/16   Multiple Vitamins-Minerals (THERAPEUTIC MULTIVITAMIN-MINERALS) tablet Take 1 tablet by mouth daily (with breakfast) 10/7/16   levothyroxine (SYNTHROID) 25 MCG tablet Take 1 tablet by mouth Daily 10/7/16     In-Hospital  Scheduled Meds:   potassium chloride  60 mEq Per NG tube BID    atorvastatin  10 mg Oral Nightly    clopidogrel  75 mg Oral Daily    levothyroxine  25 mcg Oral Daily    isosorbide mononitrate  30 mg Oral Daily    vitamin D  1,000 Units Oral Daily    docusate sodium  100 mg Oral BID    sodium phosphate IVPB  15 mmol Intravenous Once    levetiracetam  1,000 mg Intravenous Q12H    aspirin  81 mg Per NG tube Daily    metoprolol tartrate  12.5 mg Oral BID    PHENobarbital IVPB  10 mg/kg Intravenous Once    sodium chloride flush  10 mL Intravenous 14.9*   RBC  4.89  3.93*  3.57*   HGB  14.0  11.4*  10.1*   HCT  45.9  35.1*  30.3*   MCV  93.4  89.3  84.8   MCH  28.5  28.9  28.2   MCHC  30.4*  32.4*  33.2   RDW  15.1*  13.5  13.7   PLT  273  174  200   MPV  10.6*  9.6  9.0   Comparison:    2/10/2017 06:24 2/23/2017 06:36   Hemoglobin Quant 11.5 (L) 11.3 (L)   Hematocrit 34.1 (L) 33.7 (L)      BMP/CMP: Low TCO2, anion gap falling. PEDRITO superimposed on CKD, Rapid glucose reduction, high Hgb A1c - poor long term glucose control, high ketone level. Hypokalemia improved with treatment  Recent Labs      10/31/17   2359  11/01/17   0345  11/01/17   0800   NA  147*  144  140   K  2.5*  3.4*  4.5   CL  102  104  107   CO2  20*  18*  16*   ANIONGAP  25.0*  22.0*  17.0*   BUN  93*  85*  74*   CREATININE  3.3*  3.0*  2.8*   GLUCOSE  581*  384*  167*   CALCIUM  7.8*  7.1*  7.3*   Comparison:   2/27/2017 08:31 3/1/2017 05:53   BUN 61 (H) 58 (H)   Creatinine 2.3 (H) 2.3 (H)     Lipase Nl:   Ref.  Range 11/1/2017 03:45   Lipase Latest Ref Range: 5.6 - 51.3 U/L 51.7 (H)     Other Electrolytes: Treated hypophosphatemia, mild hypocalcemia, treated hypomagnesemia  Recent Labs      10/31/17   1950  10/31/17   2359  11/01/17   0345  11/01/17   0800  11/01/17   1030   CAION  1.02*   --   1.01*   --   1.02*   PHOS  1.7*  2.3*  2.6  1.5*   --    MG  1.8  1.4*  1.2*  2.0  1.9      10/31/2017 12:31 10/31/2017 15:21 10/31/2017 15:47 10/31/2017 16:14 10/31/2017 17:28 10/31/2017 18:12 10/31/2017 18:33 10/31/2017 19:17 10/31/2017 19:50 10/31/2017 23:59   Beta-Hydroxybutyrate 130.01 (H)            Glucose 1,198 (HH)   1,090 (HH)   1,006 (HH)  907 (HH) 581 (HH)   Hemoglobin A1C  17.7 (H)           POC Glucose   >600 (HH)  >600 (HH) >600 (HH)  >600 (HH)       Serum Osmolality: Hyperosmolar state, severe, on presentation - rapid fall with rapid glucose correction  Recent Labs      10/31/17   2245  11/01/17   1100   OSMOMEASER  377*  323*     Procalcitonin: Abnl - consistent with acte bacterial pericardial   effusion.      Pleural Effusion   No evidence of pleural effusion.      Aorta / Great Vessels   -Aortic root dimension within normal limits.   -The Pulmonary artery is within normal limits.   -IVC size is within normal limits with normal respiratory phasic changes.      Procedure Descriptor   Probe easily inserted by Cardiologist.  Ana Gage performed without complications.   The study included complete 2D imaging, complete spectral doppler, and   color doppler.   The transesophageal approach was used. TTE 9/13/16:  Summary   Ejection fraction is visually estimated at 81%.   Systolic function was normal.   Impaired relaxation compatible with diastolic dysfunction. ( reversed E/A   ratio)   Mildly dilated left atrium.   Mild to Moderate mitral regurgitation is present.     Lactic Acid: Mild elevation improved  Recent Labs      10/31/17   1335  10/31/17   1521   LACTA  2.9*  2.2      ABGs: Severe met acidosis improved, now concurrent resp alkalosis   10/31/2017 12:32 10/31/2017 20:03 10/31/2017 23:57 11/1/2017 06:04   pH, Blood Gas 7.12 (LL) 7.47 (H) 7.39 7.42   PCO2 16 (LL) 25 (L) 36 28 (L)   pO2 103 180 (H) 131 (H) 110 (H)   HCO3 5 (L) 18 (L) 21 (L) 18 (L)   Base Excess (Calculated) -22.2 (L) -4.4 (L) -3.2 (L) -5.5 (L)   O2 Sat 96 100 99 99   IFIO2 80 40 30 25   SET RESPIRATORY RATE 14 14 14 14   SET TIDAL VOLUME 420 420 420 420   SET PEEP 5.0 5.0 5.0 5.0   DEVICE Adult Vent Adult Vent Adult Vent Adult Vent   Temperature 33.3      Mode AC AC AC AC       Radiology/Imaging:  CT Brain & Neck w/o acute-appearing changes, CXR w/o infiltrates, edema or effusions:      Urine - Dipstick (-) for blood, (+) marked glycosuria  10/31/2017  1:04 PM   Component Value   Glucose, Ur >= 1000     Bilirubin Urine NEGATIVE    Ketones, Urine 40     Specific Gravity, Urine 1.026    Blood, Urine NEGATIVE    pH, UA 5.5    Protein, UA NEGATIVE    Urobilinogen, Urine 0.2    Nitrite, Urine NEGATIVE    Leukocyte Esterase, Urine NEGATIVE    Color, UA YELLOW    Character, Urine CLEAR    RBC, UA 0-2    WBC, UA 0-2    Epi Cells 0-2    Bacteria, UA NONE    Casts UA NONE SEEN    Crystals NONE SEEN    Renal Epithelial, Urine NONE SEEN    Yeast, UA NONE SEEN    CASTS 2 NONE SEEN    MISCELLANEOUS 2 NONE SEEN      ID: No (+) cultures  Resp specimen per ETT:  11/1/2017 10:19 AM  Component Collected Lab   Aerobic Culture 10/31/2017 11:00  [facility]   No growth-preliminary    Gram Stain Result 10/31/2017 11:00  [facility]   No segmented neutrophils observed. Rare epithelial cells observed. Moderate small gram positive bacilli. KEY ISSUES/FINDINGS/DISCUSSION / RECOMMENDATIONS:  PMHx and problems as above  Status epilepticus   - Recurrent seizures on midazolam IV. Primary dDx is ischemic brain injury not seen on initial CT, marked abnormalities of serum osmolality with rapid shifts of fluid, possible brain edema with rapid glycemic correction (duration of severe hyperglycemia undefined)    Plan:    Neurologic   - In view of uncontrolled seizure with electrolyte correction, increase in levetiracetam to high dose, midazolam, start IV barbiturate   - Follow-up EEG with above   - Follow-up CT Brain or MRI in AM if able to stabilize. Would be \"moot point\" if unable to do so neurologically   - Stop SR L-dopa-carbidopa & amantadine as well as quetiapine - seizure and BP instability risks    F/E/N   - Continue treatment DKA    - IV D10, reduce IV insulin to keep glucose 200-300 mg/dl in effort to reduce CNS edema.  Mannitol may not be good option in view of PEDRITO already   - Electrolyte correction, follow K, Mg, Phos, Ion Ca serially - high \"refeeding syndrome\" risk   - Serial renal function with above    Resp   - Maintain vent support until neurologically stable   - Serial ABG as needed    Cardiovasc   - Fluid resuscitation, reduced beta blocker as HR allows - may aid BP stabilization   - Continue dual

## 2017-11-01 NOTE — PROGRESS NOTES
Pharmacy Note  Vancomycin Consult    Jennifer Najera is a 78 y.o. female started on Vancomycin for Sepsis; consult received from Dr. Savi Melendez to manage therapy. Also receiving the following antibiotics: Zithromax, Zosyn. Patient Active Problem List   Diagnosis    Type 2 diabetes mellitus with hyperglycemia, without long-term current use of insulin ([address]    Coronary artery disease due to lipid rich plaque    Dyslipidemia    Hyperkalemia    PEDRITO (acute kidney injury) ([address]    CKD (chronic kidney disease) stage 3, GFR 30-59 ml/min    Primary osteoarthritis of right shoulder    Coronary artery disease involving native coronary artery of native heart with angina pectoris ([address]    Acute cystitis without hematuria    Urinary tract infection without hematuria    Abnormal liver function    Mitral valve insufficiency    Hyponatremia    Physical debility    Dizziness    Tremor    Poor tolerance for ambulation    Generalized weakness    Vitamin D deficiency    Orthostatic hypotension    Hyperglycemia    Parkinson disease, symptomatic ([address]    Debility    Frequent falls    Bradykinesia    Benign essential HTN    Metabolic acidosis    Acquired hypothyroidism    [facility]RITO (acute kidney injury) [facility]    Type 2 diabetes mellitus with diabetic nephropathy (HCC)       Allergies:  Review of patient's allergies indicates no known allergies.      Temp max: 100    Recent Labs      10/31/17   1231  10/31/17   1950   BUN  110*  98*       Recent Labs      10/31/17   1231  10/31/17   1950   CREATININE  4.1*  3.5*       Recent Labs      10/31/17   1231  10/31/17   1950   WBC  21.9*  9.0         Intake/Output Summary (Last 24 hours) at 10/31/17 2123  Last data filed at 10/31/17 2000   Gross per 24 hour   Intake 0 ml   Output 700 ml   Net -700 ml       Culture Date Source Results   10/31/17 BC#1&2 In process   10/31/17 MRSA In process   10/31/17 VRE In process   10/31/17 UC In process       Ht Readings from Last 1 Encounters:

## 2017-11-01 NOTE — PROGRESS NOTES
Pharmacy Vancomycin Consult     Vancomycin Day: 2  Current Dosing: Intermittent; one dose of Vancomycin 750 mg IV given    Temp max:  100    Recent Labs      11/01/17   0345  11/01/17   0800   BUN  85*  74*       Recent Labs      11/01/17   0345  11/01/17   0800   CREATININE  3.0*  2.8*       Recent Labs      10/31/17   1950  11/01/17   0345   WBC  9.0  14.9*         Intake/Output Summary (Last 24 hours) at 11/01/17 1509  Last data filed at 11/01/17 1410   Gross per 24 hour   Intake 45301 ml   Output 2000 ml   Net 9673 ml       Culture Date Source Results   10/31/17 BC#1&2 NGTD   10/31/17 MRSA In process   10/31/17 VRE In process   10/31/17 UC NGTD   10/31/17 Hip No bacteria seen   10/31/17 Wrist GPC, GPB   10/31/17 Shoulder GPB   10/31/17 ET Tube GNB, GPC, GPB, yeast         Ht Readings from Last 1 Encounters:   10/31/17 5' 4\" (1.626 m)        Wt Readings from Last 1 Encounters:   10/31/17 109 lb 12.6 oz (49.8 kg)         Body mass index is 18.85 kg/m². Estimated Creatinine Clearance: 16 mL/min (based on SCr of 2.8 mg/dL).     Trough: 10.7    Assessment/Plan:  -Tmax 100  -WBC 14.9  -SCr slightly improved  -Give Vancomycin 750 mg IV ONCE  -Check another random vancomycin tomorrow, 11/2/17 @ [address] [place] [address], PharmD  11/1/2017 3:40 PM

## 2017-11-01 NOTE — PROGRESS NOTES
VENTILATOR LIBERATION PROTOCOL    PRE-TRIAL PATIENT ASSESSMENT - COMPLETED AT 1243    Ventilatory Assessment:    PARAMETER CRITERIA FOR WEANING   Reversal  of the acute disease process that prompted intubation: No At least partial or complete reversal   FiO2 : 25 % FIO2 less than or equal to 50%     PEEP less than or equal to 5 cm H2O   Hemodynamic stability: Yes Dopamine or Dobutamine at 5 mcg/kg/minute or less   Adequate correction of electrolytes, Hgb, and HCT: Yes Within lab range   Neurologic stability: No Ability to cough, voluntarily initiate ventilator effort, cooperate with pulmonary toilet measures     ABG:   Lab Results   Component Value Date    PH 7.42 11/01/2017    PO2 110 11/01/2017    PCO2 28 11/01/2017    HCO3 18 11/01/2017    O2SAT 99 11/01/2017     HGB/WBC:  Lab Results   Component Value Date    HGB 10.1 (L) 11/01/2017    WBC 14.9 (H) 11/01/2017         Vital Signs:    PARAMETER CRITERIA FOR WEANING Meets Criteria   Pulse: 109 Within patient's normal limits / stable Yes   Resp: 17 Less than or equal to 30 Yes   BP: (!) 108/46 Within patient's normal limits / minimal pressors (Hemodynamically Stable) Yes   SpO2: 98 % Greater than or equal to 90% Yes   Temp: 99.9 °F (37.7 °C) Less than 38. 5oC / 101. 3oF Yes    Sedation/paralytic weaned No     []    Based on this assessment and the Ventilator Liberation Protocol, this patient IS being placed on a Spontaneous Breathing Trial (SBT) at this time. [x]    Based on this assessment and the Ventilator Liberation Protocol, this patient IS NOT being placed on a Spontaneous Breathing Trial (SBT) at this time. COMMENTS:  The patient is unresponsive and continues to have seizure activity frequently. Remains on the vent for airway protection.

## 2017-11-01 NOTE — PROGRESS NOTES
Attempted to see pt to evaluate skin issues. Pt was having seizures at that time and nurse suggested we try at a later time. We will attempt to see later.

## 2017-11-01 NOTE — PLAN OF CARE
Problem: Impaired respiratory status  Goal: Normal spontaneous ventilation  Outcome: Ongoing  Patient tolerating vent well at this time, will continue to monitor weaning readiness.

## 2017-11-01 NOTE — PROGRESS NOTES
The transport originated from ICU. Pt. was transported to CT and X-ray. Assisting with the transport was Diana Rose. A defibrillator was brought along on transport. Appropriate devices were applied to monitor the patient's condition during transport. A transport backpack was brought on transport, to be used in case of emergency. Patient transported  via 100% O2 via ventilator. Patient tolerated procedure well.

## 2017-11-01 NOTE — PLAN OF CARE
Problem: Discharge Planning:  Goal: Discharged to appropriate level of care  Discharged to appropriate level of care   Outcome: Not Met This Shift  Patient remains on vent - not appropriate for discharge    Problem: Fluid Volume - Imbalance:  Goal: Will remain free of signs and symptoms of dehydration  Will remain free of signs and symptoms of dehydration   Outcome: Ongoing  Continue to hydrate patient  Goal: Absence of imbalanced fluid volume signs and symptoms  Absence of imbalanced fluid volume signs and symptoms   Outcome: Ongoing  Continue to monitor    Problem: Serum Glucose Level - Abnormal:  Goal: Ability to maintain appropriate glucose levels will improve  Ability to maintain appropriate glucose levels will improve   Outcome: Ongoing  Remains in insulin gtt    Problem: Injury - Acid Base Imbalance:  Goal: Acid-base balance  Acid-base balance   Outcome: Ongoing  Continue to monitor    Problem: Aspiration:  Goal: Absence of aspiration  Absence of aspiration   Outcome: Ongoing  Continue to monitor    Problem:  Bowel Function - Altered:  Goal: Bowel elimination is within specified parameters  Bowel elimination is within specified parameters   Outcome: Not Met This Shift  No bowel movement    Problem: Cardiac Output - Decreased:  Goal: Hemodynamic stability will improve  Hemodynamic stability will improve   Outcome: Ongoing  Remains on peri    Problem: Pain:  Goal: Pain level will decrease  Pain level will decrease   Outcome: Ongoing  Continue to monitor    Problem: Skin Integrity - Impaired:  Goal: Will show no infection signs and symptoms  Will show no infection signs and symptoms   Outcome: Ongoing  Continue to monitor  Goal: Absence of new skin breakdown  Absence of new skin breakdown   Outcome: Ongoing  Turn and reposition every 2 hours    Problem: Falls - Risk of  Goal: Absence of falls  Outcome: Met This Shift  Fall precautions continue    Problem: Risk for Impaired Skin Integrity  Goal: Tissue integrity - skin and mucous membranes  Structural intactness and normal physiological function of skin and  mucous membranes. Outcome: Ongoing  Continue to monitor    Comments: Care plan reviewed with patient's family. Patient's family able to verbalize understanding of the plan of care and contribute to goal setting.

## 2017-11-01 NOTE — PLAN OF CARE
Problem: Impaired respiratory status  Goal: Normal spontaneous ventilation  Outcome: Ongoing  Patient remains on ventilator at this time. Will continue to monitor.

## 2017-11-01 NOTE — FLOWSHEET NOTE
1955 To ICU from ED. Pt. unresponsive on mech. vent support (per nasal ETT). 2015 Dr. Alena Garcia (hospitalist) to bedside to assess patient. Labs reviewed from ED presentation as well as current ABG result. Bicarb gtt and NS gtt discontinued. Change ti . 45 NaCl. 2020 Levophed initiated at 5 mcg/min per orders Dr. Alena Garcia. 2030 Exchange nasal ETT for oral ETT per Dr. Alena Garcia and R.T. staff. NImbex 15 mg IVP given for tube exchange. 2034 Nimbex 5 mg IVP additional given. 2035 7.5 ETT placed at 23 cm lip. Good chest rise/fall. Bilat. breath sounds present.       2045 PCXR completed ETT/OGT with good placement per Dr. Alena Garcia    2100 To radiology for CT neck and hip/pelvis xrays

## 2017-11-01 NOTE — PROGRESS NOTES
Pharmacy Renal Adjustment Consult    Colette Weir is a 78 y.o. female. Pharmacy has been consulted to renally adjust medications. Recent Labs      10/31/17   2359  11/01/17   0345   BUN  93*  85*       Recent Labs      10/31/17   2359  11/01/17   0345   CREATININE  3.3*  3.0*       Estimated Creatinine Clearance: 15 mL/min (based on SCr of 3 mg/dL).     Height:   Ht Readings from Last 1 Encounters:   10/31/17 5' 4\" (1.626 m)     Weight:  Wt Readings from Last 1 Encounters:   10/31/17 109 lb 12.6 oz (49.8 kg)         Plan: Adjust the following medications based on renal function:           Change Lovenox 40 mg daily to Lovenox 30 mg daily as CrCl < 30 mL/min    Alex Jaeger, PharmD  11/1/2017 6:53 AM

## 2017-11-01 NOTE — PROGRESS NOTES
Consulted to assess multiple skin areas. Pt.'s RN stated BP low and to come back at another time. Will continue to follow.  Electronically signed by Edna Singh on 11/1/2017 at 11:40 AM

## 2017-11-02 NOTE — PLAN OF CARE
Barrier Film 11/2/2017  9:00 AM   Drainage Amount None 11/2/2017  9:00 AM   Odor None 11/2/2017  9:00 AM   Purple%Wound Bed 100 11/2/2017  9:00 AM   Number of days: 1       Pressure Ulcer 10/31/17 Wrist Right (Active)   Krista-wound Assessment Clean;Dry;Edema;Fragile; Full blisters 11/2/2017  9:00 AM   Krista-Wound Texture Friable; Localized edema 11/2/2017  9:00 AM   Krista-Wound Moisture Dry 11/2/2017  9:00 AM   Krista-Wound Color Pink 11/2/2017  9:00 AM   Pressure Ulcer Staging Suspected Deep Tissue Injury 11/2/2017  9:00 AM   Wound Assessment Edema;Fragile;Light purple;Dry; Intact 11/2/2017  9:00 AM   Wound Length (cm) 7.5 cm 11/2/2017  9:00 AM   Wound Width (cm) 4.5 cm 11/2/2017  9:00 AM   Wound Depth (cm)  0.0 11/2/2017  9:00 AM   Calculated Wound Size (cm^2) (l*w) 33.75 cm^2 11/2/2017  9:00 AM   Dressing/Treatment Barrier Film 11/2/2017  9:00 AM   Drainage Amount None 11/2/2017  9:00 AM   Odor None 11/2/2017  9:00 AM   Purple%Wound Bed 100 11/2/2017  9:00 AM   Number of days: 1       Pressure Ulcer 10/31/17 Neck Right (Active)   Krista-wound Assessment Dry;Edema; Intact 11/2/2017  9:00 AM   Krista-Wound Texture Localized edema 11/2/2017  9:00 AM   Krista-Wound Moisture Dry 11/2/2017  9:00 AM   Krista-Wound Color Pink 11/2/2017  9:00 AM   Pressure Ulcer Staging Unstagable 11/2/2017  9:00 AM   Wound Assessment Dry;Edema;Fragile;Brown; White;Light purple; Other (stable eschar) 11/2/2017  9:00 AM   Wound Length (cm) 14 cm 11/2/2017  9:00 AM   Wound Width (cm) 1.5 cm 11/2/2017  9:00 AM   Wound Depth (cm)  0.5 11/2/2017  9:00 AM   Calculated Wound Size (cm^2) (l*w) 21 cm^2 11/2/2017  9:00 AM   Dressing/Treatment Other (Betadine) 11/2/2017  9:00 AM   Necrotic Type Black Eschar 11/2/2017  9:00 AM   Necrotic Amount Large: % 11/2/2017  9:00 AM   Drainage Amount None 11/2/2017  9:00 AM   Odor None 11/2/2017  9:00 AM   Yellow%Wound Bed 10 11/2/2017  9:00 AM   Black%Wound Bed 80 11/2/2017  9:00 AM   Purple%Wound Bed 10 11/2/2017  9:00 AM Number of days: 1       Pressure Ulcer 10/31/17 Chest Right;Lateral DTPI (Active)   Krista-wound Assessment Clean;Dry;Edema; Intact 11/2/2017  9:00 AM   Krista-Wound Texture Localized edema 11/2/2017  9:00 AM   Krista-Wound Moisture Dry 11/2/2017  9:00 AM   Krista-Wound Color Pink 11/2/2017  9:00 AM   Pressure Ulcer Staging Suspected Deep Tissue Injury 11/2/2017  9:00 AM   Wound Assessment Dry; Intact;Edema;Fragile;Light purple 11/2/2017  9:00 AM   Wound Length (cm) 5.5 cm 11/2/2017  9:00 AM   Wound Width (cm) 13 cm 11/2/2017  9:00 AM   Wound Depth (cm)  0.0 11/2/2017  9:00 AM   Calculated Wound Size (cm^2) (l*w) 71.5 cm^2 11/2/2017  9:00 AM   Dressing/Treatment Barrier Film 11/2/2017  9:00 AM   Drainage Amount None 11/2/2017  9:00 AM   Odor None 11/2/2017  9:00 AM   Yellow%Wound Bed 30 11/2/2017  9:00 AM   Purple%Wound Bed 70 11/2/2017  9:00 AM   Number of days: 1       Pressure Ulcer 11/01/17 Head Right (Active)   Krista-wound Assessment Clean;Dry; Intact 11/2/2017  9:00 AM   Krista-Wound Texture Localized edema 11/2/2017  9:00 AM   Krista-Wound Moisture Dry 11/2/2017  9:00 AM   Krista-Wound Color Pink 11/2/2017  9:00 AM   Pressure Ulcer Staging Suspected Deep Tissue Injury 11/2/2017  9:00 AM   Wound Assessment Dry; Intact;Edema;Light purple 11/2/2017  9:00 AM   Wound Length (cm) 1 cm 11/2/2017  9:00 AM   Wound Width (cm) 2 cm 11/2/2017  9:00 AM   Wound Depth (cm)  0.0 11/2/2017  9:00 AM   Calculated Wound Size (cm^2) (l*w) 2 cm^2 11/2/2017  9:00 AM   Dressing/Treatment Barrier film 11/2/2017  9:00 AM   Drainage Amount None 11/2/2017  9:00 AM   Odor None 11/2/2017  9:00 AM   Purple%Wound Bed 100 11/2/2017  9:00 AM   Number of days: 1       Pressure Ulcer 10/31/17 Arm Posterior; Upper DTPI (Active)   Krista-wound Assessment Clean;Dry;Edema;Fragile 11/2/2017  9:00 AM   Krista-Wound Texture Localized edema 11/2/2017  9:00 AM   Krista-Wound Moisture Dry 11/2/2017  9:00 AM   Krista-Wound Color Pink 11/2/2017  9:00 AM   Pressure Ulcer Staging Suspected Deep Tissue Injury 11/2/2017  9:00 AM   Wound Assessment Dry; Intact;Edema;Fragile;Light purple 11/2/2017  9:00 AM   Wound Length (cm) 2.5 cm 11/2/2017  9:00 AM   Wound Width (cm) 5 cm 11/2/2017  9:00 AM   Wound Depth (cm)  0.0 11/2/2017  9:00 AM   Calculated Wound Size (cm^2) (l*w) 12.5 cm^2 11/2/2017  9:00 AM   Dressing/Treatment Barrier Film 11/2/2017  9:00 AM   Wound Cleansed Rinsed/Irrigated with saline 11/2/2017  9:00 AM   Drainage Amount None 11/2/2017  9:00 AM   Odor None 11/2/2017  9:00 AM   Purple%Wound Bed 100 11/2/2017  9:00 AM   Number of days: 1       Pressure Ulcer 11/02/17 Arm Right DTPI in axilla (Active)   Krista-wound Assessment Clean;Dry;Edema; Intact 11/2/2017  9:00 AM   Krista-Wound Texture Localized edema 11/2/2017  9:00 AM   Krista-Wound Moisture Dry 11/2/2017  9:00 AM   Krista-Wound Color Pink 11/2/2017  9:00 AM   Pressure Ulcer Staging Suspected Deep Tissue Injury 11/2/2017  9:00 AM   Wound Assessment Dry; Intact;Edema;Fragile;Light purple 11/2/2017  9:00 AM   Wound Length (cm) 10 cm 11/2/2017  9:00 AM   Wound Width (cm) 1.5 cm 11/2/2017  9:00 AM   Wound Depth (cm)  0.0 11/2/2017  9:00 AM   Calculated Wound Size (cm^2) (l*w) 15 cm^2 11/2/2017  9:00 AM   Dressing/Treatment Barrier Film 11/2/2017  9:00 AM   Drainage Amount None 11/2/2017  9:00 AM   Odor None 11/2/2017  9:00 AM   Purple%Wound Bed 100 11/2/2017  9:00 AM   Number of days: 0       Pressure Ulcer 11/02/17 Arm Right stage 1 in axilla (Active)   Krista-wound Assessment Clean;Dry;Edema; Intact 11/2/2017  9:00 AM   Krista-Wound Texture Localized edema 11/2/2017  9:00 AM   Krista-Wound Moisture Dry 11/2/2017  9:00 AM   Krista-Wound Color Pink 11/2/2017  9:00 AM   Pressure Ulcer Staging Stage I 11/2/2017  9:00 AM   Wound Assessment Dry; Intact;Edema; Red 11/2/2017  9:00 AM   Wound Length (cm) 3 cm 11/2/2017  9:00 AM   Wound Width (cm) 1.2 cm 11/2/2017  9:00 AM   Wound Depth (cm)  0.0 11/2/2017  9:00 AM   Calculated Wound Size (cm^2) (l*w) 3.6 cm^2

## 2017-11-02 NOTE — PLAN OF CARE
Problem: Nutrition  Goal: Optimal nutrition therapy  Outcome: Ongoing  Nutrition Problem: Inadequate oral intake  Intervention: Food and/or Nutrient Delivery: Start Tube Feeding  Nutritional Goals: TF to provide  % of nutrient needs while pt is intubated.

## 2017-11-02 NOTE — FLOWSHEET NOTE
1450 Patient terminally weaned from ventilator per family's wishes; oral care completed; patient repositioned for comfort.

## 2017-11-02 NOTE — PROCEDURES
[address] [place] [address]                         ELECTROENCEPHALOGRAM REPORT    PATIENT NAME: Jeremias Granados                       :             1950  MED REC NO:   [id number]                            ROOM:            0007  ACCOUNT NO:   [de-identified]                            ADMISSION DATE:  10/31/2017  PROVIDER:     Issac Nichols. Andrea Perez MD    DATE OF EE2017    REFERRING PROVIDER:  Dr. No Spain:  This 23-year-old female with a presentation of  seizure. The patient was found unresponsive with a DKA sepsis. The  patient is noted to have constant mouth and face twitching and  jerking, head knocking movement, evaluate for seizure. MEDICATIONS LISTED:  Levothyroxine, Keppra, aspirin, metoprolol,  phenobarb, famotidine, piperacillin, tazobactam, Lovenox, vancomycin. This is 17 channel EEG performed without sleep deprivation. Hyperventilation was not performed as patient is on the ventilator. Photic stimulation was not performed. Of note the patient is also  Versed. The background is noted to be of low voltage, poorly modulated. Left  frontal sharp waves were noted periodically during the recording that  may be epileptogenic in nature, indicates seizure tendency in the  proper clinical context. Lead and muscle artifacts were noted  limiting quality of data obtained. Left frontal epileptiform  discharge is in the form of spike and wave, and sharp wave activity  are noted. Photic stimulation was not performed. There was no  satisfactory background rhythm reached throughout this recording. IMPRESSION:  This is an abnormal EEG due to the presence of  epileptiform discharges originating from the left frontal area  periodically during the recording. In addition, there was no  satisfactory background rhythm reached throughout this recording.   The  patient appeared to have face and mouth twitching with head  rocking movement that did not have a clear EEG correlates. Results  above were related to clinical service shortly upon completion of this  study.         Daleen Koyanagi, MD    D: 11/02/2017 5:06:53       T: 11/02/2017 5:09:07     [email or web address]  Job#: [id number]     Doc#: [id number]

## 2017-11-02 NOTE — PROGRESS NOTES
This staff and Shlomo Yanez RN met with pt and her brother and sister in law for hospice referral and admission. Staff offered support and will remain available.

## 2017-11-02 NOTE — PROGRESS NOTES
reduction                           - CT Brain (-) for acute appearing changes  - Rhabdomyolysis                           - Likely referable to static state (lack of movement) of unknown duration at home  - Troponin elevation                           - Suspected acute cardiac injury - may be referable to global hypoxemia,severe acidemia                           - Recurrent coronary occlusion cannot be excluded                           - Element PEDRITO superimposed on CKD possible also  - Parkinson's Disease  - Hypovitaminosis D (Treated)  - Hypomagnesemia, hypocalcemia, hypophosphatemia, hypokalemia  - Multifocal subacute ischemic strokes (cerebral infarctions)       Reviewed Nursing Notes for Previous 24 hours of Inpatient Charting:       Pain Scale:   No s/s of pain noted at this time. Graphics:   BP (!) 125/55   Pulse 86   Temp 98.6 °F (37 °C) (Core)   Resp 16   Ht 5' 4\" (1.626 m)   Wt 55 lb 12.8 oz (25.3 kg)   SpO2 99%   BMI 9.58 kg/m²       Current Medications:    Current Facility-Administered Medications: glycopyrrolate (ROBINUL) injection 0.2 mg, 0.2 mg, Intravenous, Q4H PRN  morphine injection 1 mg, 1 mg, Intravenous, Q1H PRN **OR** morphine injection 2 mg, 2 mg, Intravenous, Q1H PRN **OR** morphine (PF) injection 3 mg, 3 mg, Intravenous, Q1H PRN **OR** morphine (PF) injection 4 mg, 4 mg, Intravenous, Q1H PRN  midazolam (VERSED) injection 2 mg, 2 mg, Intravenous, PRN  midazolam (VERSED) 100 mg in dextrose 5 % 100 mL infusion, 10 mg/hr, Intravenous, Continuous  levETIRAcetam (KEPPRA) 1,000 mg in sodium chloride 0.9 % 100 mL IVPB, 1,000 mg, Intravenous, Q12H     Medication Adjustments: IV medication for comfort ordered as patient unresponsive    Reviewed Physician Orders and Progress Notes: For this hospital stay including labs, vital signs, imaging, and nursing assessments. Hospice concepts and philosophy explained:  Yes

## 2017-11-02 NOTE — PROGRESS NOTES
PCO2 25 11/02/2017    PO2 99 11/02/2017    HCO3 16 11/02/2017    O2SAT 98 11/02/2017     Lab Results   Component Value Date    IFIO2 21 11/02/2017    MODE AC 11/02/2017    SETTIDVOL 450 11/02/2017    SETPEEP 5.0 11/01/2017     ID:  Wound culture:  11/2/2017 12:50 PM   Component Collected Lab   Aerobic Culture  (Abnormal) 10/31/2017 11:00  [facility]    (A)   No growth-preliminary   Culture also yielded very light growth of Staphylococcus species (coaguase negative), gram positive bacilli most consistent with Corynebacterium species, and Enterococcus species. Gram Stain Result 10/31/2017 11:00  [facility]   No segmented neutrophils observed. No epithelial cells observed. No bacteria seen     Radiology/Imaging:    CXR - Small pleural effusions, bibasilar atelectasis vs infiltrates  CT brain abnl:  Impression   1. There has been interval development of focal areas of hypoattenuation within the right frontal and right temporal/occipital lobes, suggestive of subacute infarcts. No intracranial hemorrhage is identified. 2. A fluid level is present within the right maxillary sinus. This may be secondary to the patient's intubated status versus acute sinusitis in the proper clinical setting. EEG 11/1:  IMPRESSION:  This is an abnormal EEG due to the presence of epileptiform discharges originating from the left frontal area periodically during the recording. In addition, there was no satisfactory background rhythm reached throughout this recording. The patient appeared to have face and mouth twitching with head  rocking movement that did not have a clear EEG correlates. Results above were related to clinical service shortly upon completion of this study. Drug Level:   Ref.  Range 11/2/2017 03:30   Phenobarbital Latest Ref Range: 10.0 - 48.0 mcg/ml 40.9     KEY ISSUES/FINDINGS/DISCUSSION / RECOMMENDATIONS:  Problems as noted  Cellulitis, wound infections with DTI on

## 2017-11-02 NOTE — PLAN OF CARE
Problem: Discharge Planning:  Goal: Discharged to appropriate level of care  Discharged to appropriate level of care   Outcome: Ongoing  Not appropriate at this time     Problem: Fluid Volume - Imbalance:  Goal: Will remain free of signs and symptoms of dehydration  Will remain free of signs and symptoms of dehydration   Outcome: Ongoing  Patient remains positive 11L     Problem: Serum Glucose Level - Abnormal:  Goal: Ability to maintain appropriate glucose levels will improve  Ability to maintain appropriate glucose levels will improve   Outcome: Ongoing  Continues on insulin drip     Problem: Injury - Acid Base Imbalance:  Goal: Acid-base balance  Acid-base balance   Outcome: Ongoing  Monitoring ABGs     Problem: Aspiration:  Goal: Absence of aspiration  Absence of aspiration   Outcome: Ongoing  Aspiration precautions remain in place. No signs of aspiration noted this shift. Problem: Bowel Function - Altered:  Goal: Bowel elimination is within specified parameters  Bowel elimination is within specified parameters   Outcome: Ongoing  Patient has not had a bowel movement this shift     Problem: Cardiac Output - Decreased:  Goal: Hemodynamic stability will improve  Hemodynamic stability will improve   Outcome: Ongoing  Patient remains on peri     Problem: Pain:  Goal: Pain level will decrease  Pain level will decrease   Outcome: Ongoing  Continue to monitor     Problem: Skin Integrity - Impaired:  Goal: Will show no infection signs and symptoms  Will show no infection signs and symptoms   Outcome: Ongoing  Patient showing no signs of new skin breakdown. Patient being turned and repositioned Q2 hrs and PRN with pillow support. Pillow support given to extremities x4. Problem: Falls - Risk of  Goal: Absence of falls  Outcome: Ongoing  Free from falls this shift. Fall precautions remain in place at this time.        Problem: Risk for Impaired Skin Integrity  Goal: Tissue integrity - skin and mucous membranes  Structural intactness and normal physiological function of skin and  mucous membranes. Outcome: Ongoing  Mucous membranes are pink, moist, and remain without lesions. Oral care completed Q2 hrs and PRN. Skin integrity remains intact. Comments: Unable to review plan of care with patient due to patient condition. Will continue to monitor.

## 2017-11-02 NOTE — PROGRESS NOTES
At approximately this time, pt transported to and from ct. ..  About 20 minutes total.  No problem noted, pt remained connected to ventilator t/o transport

## 2017-11-02 NOTE — PLAN OF CARE
Problem: Impaired respiratory status  Goal: Normal spontaneous ventilation  Outcome: Ongoing  VENTILATOR LIBERATION PROTOCOL    PRE-TRIAL PATIENT ASSESSMENT - COMPLETED AT 0925. Pt is on versed for seizure activity yesterday--rn is unsure if we are going to do a sbt today. Waiting on dr Perri Ferguson to come in bc he's managing ventilator. Ventilatory Assessment:    PARAMETER CRITERIA FOR WEANING   Reversal  of the acute disease process that prompted intubation: No At least partial or complete reversal   FiO2 : 21 % FIO2 less than or equal to 50%     PEEP less than or equal to 5 cm H2O   Hemodynamic stability:  Dopamine or Dobutamine at 5 mcg/kg/minute or less   Adequate correction of electrolytes, Hgb, and HCT:  Within lab range   Neurologic stability: No Ability to cough, voluntarily initiate ventilator effort, cooperate with pulmonary toilet measures     ABG:   Lab Results   Component Value Date    PH 7.42 11/01/2017    PO2 110 11/01/2017    PCO2 28 11/01/2017    HCO3 18 11/01/2017    O2SAT 99 11/01/2017     HGB/WBC:  Lab Results   Component Value Date    HGB 9.8 (L) 11/02/2017    WBC 23.1 (H) 11/02/2017         Vital Signs:    PARAMETER CRITERIA FOR WEANING Meets Criteria   Pulse: 99 Within patient's normal limits / stable Yes   Resp: 16 Less than or equal to 30 Yes   BP: (!) 105/52 Within patient's normal limits / minimal pressors (Hemodynamically Stable) No   SpO2: 98 % Greater than or equal to 90% Yes   Temp: 99.3 °F (37.4 °C) Less than 38. 5oC / 101. 3oF Yes   Versed for seizure activity Sedation/paralytic weaned No     []    Based on this assessment and the Ventilator Liberation Protocol, this patient IS being placed on a Spontaneous Breathing Trial (SBT) at this time. [x]    Based on this assessment and the Ventilator Liberation Protocol, this patient IS NOT being placed on a Spontaneous Breathing Trial (SBT) at this time.

## 2017-11-03 NOTE — DISCHARGE SUMMARY
Death Summary    Patient:  Brenden Mean  YOB: 1950    MRN: [id number]   Acct: [de-identified]    Primary Care Physician: Katina Moser CNP    Admit date:  2017    Discharge date:  11/3/2017    Admission Condition:critical    Discharge Diagnoses:   dka - and coma-->anoxic brain injury  Multiple ischemic strokes on presentation  Recurrent focal seizure progressing to status epilepticus  lynsey on ckd 3  Severe MR hx  Acute cellulitis of limbs        Consultants:  Pt was in hospice    Significant Diagnostic Studies: In hospice    Hospital Course: from ICU discharge summary- pt  few hrs later after ICU tx  This patient is a 42-year-old   female. She has a very complex past medical history as noted below. On the night of admission, she was brought to the emergency room after  local visiting nurse service found no answer at the patient's home. EMS was called and the patient's home was broken into where she was  found to be down and unresponsive on a bathroom floor with both  hypotension and hypoxemia. The patient was found to have a large  volume of vomitus in her mouth and was thus nasally intubated and  transferred to the emergency department for further care. On arrival  here, she was found to have a marked anion gap metabolic acidosis,  increased ketones, and extreme hyperglycemia with a level of 1090  mg/dL. She displayed acute on chronic kidney injury and relative  hypotension. She was subsequently admitted to the ICU for further  care. At that time, Dr. Alexa Grimm saw the patient and changed her out to  an orotracheal tube. Further care was assumed by the critical care  staff. Notably, the patient was extremely acidemic in the emergency  room on presentation and remained there for approximately 7.5 hours  without other significant interventions having been done until she  actually was transferred to the ICU.   For further details, please see  admission chart notes.  [facility] COURSE:  As noted, the patient was transferred to the ICU and  Dr. Romana Butters appropriately changed her endotracheal tube. Adequate IV  access was established with a femoral CVC line. She remained on  treatment for diabetic ketoacidosis and actually had excessively rapid  reduction of glucose which is felt to be neurologically detrimental to  her. The patient was placed on D10 and her IV insulin reduced, and  with this combination, we were able to resume appropriate glucose  levels in the mid 200 for the first 24 hours in. The patient  unfortunately developed recurrent focal motor seizure activity with  facial and neck twitching. The seizure activity was confirmed by EEG. The patient responded poorly to intravenous Keppra and the dose was  increased without benefit. She had only transient response to boluses  of intravenous midazolam and a 10 mg per hour midazolam drip failed to  control the patient adequately as well. Ultimately, multiple doses of  IV phenobarbital were given and after an estimated total of 40 mg/kg,  her seizure activity seemed to yobani though it did not completely  resolve in spite of the fact that phenobarbital was at the high  therapeutic range. The patient's initial CT of the brain failed to  disclose acute abnormalities; however, there was substantial concern  that the patient had sustained neurologic injury and a repeat study  was performed on 11/02 revealing the interval developments of multiple  areas of hypoattenuation within the right frontal lobe and the  posterolateral aspect of the right temporal and occipital lobes. Extensive areas of low density were seen along the lateral aspect of  the right cerebral hemisphere.     As noted, in spite of appropriate electrolyte correction, glucose  correction to a desirable range, and multiple anticonvulsants, the  patient continued to have focal motor seizure activity of the face and  neck.   This could not be suppressed in spite of multiple  anticonvulsant use. She did not show any awakening on this  anticonvulsant regimen. The patient did have multiple areas of skin  breakdown and pressure ulcers with deep tissue injury on presentation  at the hospital with an associated cellulitis as she was found covered  in stool and urine at her home. Antibiotics were continued along with  wound care during the course of the admission. The patient was found  to have significantly abnormal cardiac enzymes with troponin  elevations consist with acute cardiac injury. She has had relatively  recent coronary artery bypass grafting in 2016. There was concern for  graft occlusion as well as the effects of cardiac injury referable to  global hypoxemia. Nonetheless, her hemodynamics were not terribly  unstable throughout her ICU stay requiring only modest doses of  phenylephrine for support. The patient also had moderate elevations  of serum CK level suspected to represent stasis related  rhabdomyolysis.     Ultimately, with CT evidence of significant neurologic injury and  persistent seizures in spite of multiple anticonvulsants, extensive  discussions were undertaken with the patient's family. She has no  children or spouse, and all of her remaining family including a  brother, nephew, and sister-in-law were present in the ICU today. They had previously spoken with the Palliative Care team yesterday and  the patient was made a limited resuscitation at that point. After  extensive discussion today and communication of what was felt to be a  very poor overall neurologic prognosis for recovery in light of  persistent seizures and CT abnormalities as noted above, the patient's  family wished no further active care and she was transitioned to a do  not resuscitate comfort care status.   As the patient had stable  ventilatory effort in spite of midazolam infusion, she was extubated  with appropriate comfort care orders in place and tolerated this  extremely well maintaining good oxygenation throughout. The patient's  family was amenable to hospice therapy and the patient was felt to be  appropriate for general inpatient hospice in light of her need for IV  anticonvulsant therapy and sedation titration. The patient was  therefore transitioned from ICU to the general inpatient hospice unit  and Dr. Nish Matos of the hospitalist service was kind enough to accept the  patient for management. Dr. Darnell Hu reviewed the case with her at  length to assist in her familiarity in the care of this unfortunate  woman. The patient was thus transitioned to the inpatient hospice  unit for further terminal care. The patient's family is aware that  the consequences of their actions will maybe likely death of the  patient; however, they believe that we have already exceeded the  support that she would want in light of her significant neurologic  injury and wish only for her to be kept comfortable on what time may  remain to her.     Cause of Death: anoxic brain injury and inability to protect airway    Time of Death:- 7:15 am 11/3/2017    Disposition:     Autopsy Requested:No    Time Spent: 35 minutes    Electronically signed by Haven Wylie MD on 11/3/2017 at 12:06 PM    Discharging Hospitalist

## 2017-11-03 NOTE — PROGRESS NOTES
Terminal Diagnosis: CVA    Reviewed Nursing Notes for Previous 24 hours of Inpatient Charting:       Pain Scale:   Unable to obtain     Graphics:   /83   Pulse 91   Temp 97.9 °F (36.6 °C) (Axillary)   Resp 16   Ht 5' 4\" (1.626 m)   Wt 125 lb 3.2 oz (56.8 kg)   SpO2 92%   BMI 21.49 kg/m²       Current Medications:    Current Facility-Administered Medications: glycopyrrolate (ROBINUL) injection 0.2 mg, 0.2 mg, Intravenous, Q4H PRN  levETIRAcetam (KEPPRA) 1,000 mg in sodium chloride 0.9 % 100 mL IVPB, 1,000 mg, Intravenous, Q12H  midazolam (VERSED) 100 mg in dextrose 5 % 100 mL infusion, 10 mg/hr, Intravenous, Continuous  midazolam (VERSED) injection 2 mg, 2 mg, Intravenous, PRN  morphine injection 1 mg, 1 mg, Intravenous, Q1H PRN **OR** morphine injection 2 mg, 2 mg, Intravenous, Q1H PRN **OR** morphine injection 3 mg, 3 mg, Intravenous, Q1H PRN **OR** morphine injection 4 mg, 4 mg, Intravenous, Q1H PRN     Medication Adjustments: none    Reviewed Physician Orders and Progress Notes: With Tip Howard RN, no concerns or needs at this time. Patient in bed with eyes closed. No visual signs of distress noted. No family present.

## 2017-11-03 NOTE — PLAN OF CARE
Problem: Falls - Risk of  Goal: Absence of falls  Outcome: Met This Shift      Problem: Risk for Impaired Skin Integrity  Goal: Tissue integrity - skin and mucous membranes  Structural intactness and normal physiological function of skin and  mucous membranes. Outcome: Met This Shift  Patient turned and changed every 2 hours and prn.

## 2017-11-06 LAB
BLOOD CULTURE, ROUTINE: NORMAL
BLOOD CULTURE, ROUTINE: NORMAL

## 2021-01-01 NOTE — CARE COORDINATION
DISCHARGE BARRIERS  11/2/17, 2:11 PM    Reason for Referral: Discharge planning  Mental Status: Pt on vent, not communicating. Family reports pt was alert and oriented prior to this admission  Decision Making: Pt made her own decisions PTA. Evaluation for Competency was completed 6 yrs ago and pt was found to be competent according to family. Family/Social/Home Environment:  Assessment completed with pts brother, Olegario Johnson, his wife and pts nephew, Christopher Wood. Family states pt lives alone in a one story home. Pt is described as being a Hoarder and is non compliant with diabetes diagnosis. Family states pt does not have a mental health diagnosis however, pt is reported as being paranoid over certain things and hard to get along with. Family states for this reason they were not involved in her care at home. Current Services:  Family states pt has [facility]. SW confirmed with [employer] , Pt also has MOM's meals, [place] on aging, and [employer] . Current Equipment: Family states Pt uses a walker in the home. Payment Source:  Medicare and Medicaid. Concerns or Barriers to Discharge: Unknown at this time  Collabrative List of ECF/HH were provided: Not at this time. Teach Back Method used with family regarding care plan and discharge planning  family verbalize understanding of the plan of care and contribute to goal setting. Anticipated Needs/Discharge Plan:  Discharge plan is unknown pending medical condition. Palliative care and Hospice consulted. Pt current with [facility] services and [facility].     Electronically signed by MINE Rodríguez on 11/2/2017 at 2:11 PM N/A